# Patient Record
Sex: FEMALE | Race: WHITE | NOT HISPANIC OR LATINO | Employment: OTHER | URBAN - METROPOLITAN AREA
[De-identification: names, ages, dates, MRNs, and addresses within clinical notes are randomized per-mention and may not be internally consistent; named-entity substitution may affect disease eponyms.]

---

## 2017-02-11 ENCOUNTER — RADIANT APPOINTMENT (OUTPATIENT)
Dept: GENERAL RADIOLOGY | Facility: CLINIC | Age: 37
End: 2017-02-11
Attending: NURSE PRACTITIONER

## 2017-02-11 ENCOUNTER — OFFICE VISIT (OUTPATIENT)
Dept: URGENT CARE | Facility: URGENT CARE | Age: 37
End: 2017-02-11

## 2017-02-11 ENCOUNTER — TELEPHONE (OUTPATIENT)
Dept: NURSING | Facility: CLINIC | Age: 37
End: 2017-02-11

## 2017-02-11 ENCOUNTER — VIRTUAL VISIT (OUTPATIENT)
Dept: FAMILY MEDICINE | Facility: OTHER | Age: 37
End: 2017-02-11

## 2017-02-11 VITALS
TEMPERATURE: 100.2 F | HEART RATE: 105 BPM | OXYGEN SATURATION: 95 % | WEIGHT: 128.6 LBS | RESPIRATION RATE: 18 BRPM | DIASTOLIC BLOOD PRESSURE: 73 MMHG | SYSTOLIC BLOOD PRESSURE: 114 MMHG

## 2017-02-11 DIAGNOSIS — J18.9 PNEUMONIA OF BOTH LOWER LOBES DUE TO INFECTIOUS ORGANISM: Primary | ICD-10-CM

## 2017-02-11 DIAGNOSIS — J20.9 ACUTE BRONCHITIS WITH SYMPTOMS > 10 DAYS: ICD-10-CM

## 2017-02-11 DIAGNOSIS — R05.9 COUGH: ICD-10-CM

## 2017-02-11 LAB
BASOPHILS # BLD AUTO: 0 10E9/L (ref 0–0.2)
BASOPHILS NFR BLD AUTO: 0.1 %
DIFFERENTIAL METHOD BLD: ABNORMAL
EOSINOPHIL # BLD AUTO: 0.1 10E9/L (ref 0–0.7)
EOSINOPHIL NFR BLD AUTO: 0.5 %
ERYTHROCYTE [DISTWIDTH] IN BLOOD BY AUTOMATED COUNT: 12.2 % (ref 10–15)
HCT VFR BLD AUTO: 43.3 % (ref 35–47)
HGB BLD-MCNC: 14.6 G/DL (ref 11.7–15.7)
LYMPHOCYTES # BLD AUTO: 2 10E9/L (ref 0.8–5.3)
LYMPHOCYTES NFR BLD AUTO: 13.1 %
MCH RBC QN AUTO: 29.3 PG (ref 26.5–33)
MCHC RBC AUTO-ENTMCNC: 33.7 G/DL (ref 31.5–36.5)
MCV RBC AUTO: 87 FL (ref 78–100)
MONOCYTES # BLD AUTO: 1.5 10E9/L (ref 0–1.3)
MONOCYTES NFR BLD AUTO: 9.8 %
NEUTROPHILS # BLD AUTO: 11.7 10E9/L (ref 1.6–8.3)
NEUTROPHILS NFR BLD AUTO: 76.5 %
PLATELET # BLD AUTO: 366 10E9/L (ref 150–450)
RBC # BLD AUTO: 4.98 10E12/L (ref 3.8–5.2)
WBC # BLD AUTO: 15.2 10E9/L (ref 4–11)

## 2017-02-11 PROCEDURE — 87040 BLOOD CULTURE FOR BACTERIA: CPT | Performed by: NURSE PRACTITIONER

## 2017-02-11 PROCEDURE — 99203 OFFICE O/P NEW LOW 30 MIN: CPT | Mod: 25 | Performed by: NURSE PRACTITIONER

## 2017-02-11 PROCEDURE — 36415 COLL VENOUS BLD VENIPUNCTURE: CPT | Performed by: NURSE PRACTITIONER

## 2017-02-11 PROCEDURE — 71020 XR CHEST 2 VW: CPT

## 2017-02-11 PROCEDURE — 85025 COMPLETE CBC W/AUTO DIFF WBC: CPT | Performed by: NURSE PRACTITIONER

## 2017-02-11 PROCEDURE — 94640 AIRWAY INHALATION TREATMENT: CPT | Performed by: NURSE PRACTITIONER

## 2017-02-11 RX ORDER — AZITHROMYCIN 250 MG/1
TABLET, FILM COATED ORAL
Qty: 6 TABLET | Refills: 0 | Status: SHIPPED | OUTPATIENT
Start: 2017-02-11 | End: 2017-03-30

## 2017-02-11 RX ORDER — ALBUTEROL SULFATE 90 UG/1
2 AEROSOL, METERED RESPIRATORY (INHALATION) EVERY 6 HOURS PRN
Qty: 1 INHALER | Refills: 0 | Status: SHIPPED | OUTPATIENT
Start: 2017-02-11 | End: 2021-03-27

## 2017-02-11 RX ORDER — ALBUTEROL SULFATE 0.83 MG/ML
1 SOLUTION RESPIRATORY (INHALATION) ONCE
Qty: 3 ML | Refills: 0 | COMMUNITY
End: 2017-03-30

## 2017-02-11 RX ORDER — PREDNISONE 20 MG/1
TABLET ORAL
Qty: 10 TABLET | Refills: 0 | Status: SHIPPED | OUTPATIENT
Start: 2017-02-11 | End: 2017-03-30

## 2017-02-11 NOTE — PROGRESS NOTES
SUBJECTIVE:  Emani Luna is a 36 year old female who presents to the clinic today with a chief complaint of cough  for 2 week(s).  Her cough is described as nonproductive.    The patient's symptoms are mild and worsening.  Associated symptoms include congestion and fever. The patient's symptoms are exacerbated by no particular triggers  Patient has been using OTC cough suppressants  to improve symptoms.    No past medical history on file.    Social History   Substance Use Topics     Smoking status: Not on file     Smokeless tobacco: Not on file     Alcohol Use: Not on file         ROS  CONSTITUTIONAL:POSITIVE  for fever   INTEGUMENTARY/SKIN: NEGATIVE for worrisome rashes, moles or lesions  EYES: NEGATIVE for vision changes or irritation  ENT/MOUTH: NEGATIVE for ear, mouth and throat problems  RESP:See above   CV: NEGATIVE for chest pain, palpitations or peripheral edema  MUSCULOSKELETAL: NEGATIVE for significant arthralgias or myalgia  NEURO: NEGATIVE for weakness, dizziness or paresthesias    OBJECTIVE:  /73 mmHg  Pulse 125  Temp(Src) 100.2  F (37.9  C) (Tympanic)  Resp 16  Wt 128 lb 9.6 oz (58.333 kg)  SpO2 92%  GENERAL APPEARANCE: healthy, alert and no distress  EYES: EOMI,  PERRL, conjunctiva clear  HENT: ear canals and TM's normal.  Nose and mouth without ulcers, erythema or lesions  NECK: supple, nontender, no lymphadenopathy  RESP: lungs clear to auscultation - no rales, rhonchi or wheezes  CV: regular rates and rhythm, normal S1 S2, no murmur noted  ABDOMEN:  soft, nontender, no HSM or masses and bowel sounds normal  NEURO: Normal strength and tone, sensory exam grossly normal,  normal speech and mentation  SKIN: no suspicious lesions or rashes    X-RAY:   XR CHEST 2 VW   2/11/2017 2:03 PM       HISTORY: Acute bronchitis, unspecified     COMPARISON: None.                                                                       IMPRESSION: Bibasilar infiltrates suspicious for pneumonia. No  pleural  fluid. Heart size is normal.    ASSESSMENT:      ICD-10-CM    1. Pneumonia of both lower lobes due to infectious organism J18.9 Blood culture     amoxicillin-clavulanate (AUGMENTIN) 875-125 MG per tablet   2. Acute bronchitis with symptoms > 10 days J20.9 predniSONE (DELTASONE) 20 MG tablet     albuterol (PROAIR HFA/PROVENTIL HFA/VENTOLIN HFA) 108 (90 BASE) MCG/ACT Inhaler     azithromycin (ZITHROMAX Z-MATTHIEU) 250 MG tablet     XR Chest 2 Views     CBC with platelets differential   3. Cough R05 ALBUTEROL UNIT DOSE, 1 MG     INHALATION/NEBULIZER TREATMENT, INITIAL     CANCELED: XR Chest 2 Views         PLAN:  Patient was given an in office albuterol nebulizer treatment while in the office with clearing of their wheezing.   Patient given ibuprofen in the office.  Her fever reduced and heart rate decreased to 105. Saturation post nebulizer treatment was 95%    Based on her presenting syptoms and clinical findings will obtain a blood culture   Patient Instructions     Use Medication as directed  Hydrate with fluids, rest, cool humidifier.  May use acetaminophen, ibuprofen prn.    For your Cough   The Buckwheat Honey Dose: Given   hour Prior to Bedtime  For children age under 1 year -Do not use due to botulism risk     2-5 years -  tsp (2.5 mL)    6-11 years -1 tsp (5 mL)    12-18 years -2 tsp (10 mL)     Guaifenesin     Adult dose -Not to exceed 2.4 g (2400mg) per day    Child age 6-12 years -100 mg every 4 hr, not to exceed 1.2 g (1200mg) per day     Pediatric, 2-6 years -50 mg every 4 hr as needed, not to exceed 600 mg per day    Cough medications is not recommended in children under 2 years.  With use of cough medications have combination medications be aware of products in the cough medication you are using to avoid overdose    Follow up with PCP in 2 weeks.    Go to Emergency Room if sx worsen or change, Shortness of breath, chest pain, persistent fevers, or painful breathing occur.     Patient voiced  understanding of instructions given.          Bronchitis, Antibiotic Treatment (Adult)    Bronchitis is an infection of the air passages (bronchial tubes) in your lungs. It often occurs when you have a cold. This illness is contagious during the first few days and is spread through the air by coughing and sneezing, or by direct contact (touching the sick person and then touching your own eyes, nose, or mouth).  Symptoms of bronchitis include cough with mucus (phlegm) and low-grade fever. Bronchitis usually lasts 7 to 14 days. Mild cases can be treated with simple home remedies. More severe infection is treated with an antibiotic.  Home care  Follow these guidelines when caring for yourself at home:    If your symptoms are severe, rest at home for the first 2 to 3 days. When you go back to your usual activities, don't let yourself get too tired.    Do not smoke. Also avoid being exposed to secondhand smoke.    You may use over-the-counter medicines to control fever or pain, unless another medicine was prescribed. (Note: If you have chronic liver or kidney disease or have ever had a stomach ulcer or gastrointestinal bleeding, talk with your healthcare provider before using these medicines. Also talk to your provider if you are taking medicine to prevent blood clots.) Aspirin should never be given to anyone younger than 18 years of age who is ill with a viral infection or fever. It may cause severe liver or brain damage.    Your appetite may be poor, so a light diet is fine. Avoid dehydration by drinking 6 to 8 glasses of fluids per day (such as water, soft drinks, sports drinks, juices, tea, or soup). Extra fluids will help loosen secretions in the nose and lungs.    Over-the-counter cough, cold, and sore-throat medicines will not shorten the length of the illness, but they may be helpful to reduce symptoms. (Note: Do not use decongestants if you have high blood pressure.)    Finish all antibiotic medicine. Do this  even if you are feeling better after only a few days.  Follow-up care  Follow up with your healthcare provider, or as advised. If you had an X-ray or ECG (electrocardiogram), a specialist will review it. You will be notified of any new findings that may affect your care.  Note: If you are age 65 or older, or if you have a chronic lung disease or condition that affects your immune system, or you smoke, talk to your healthcare provider about having pneumococcal vaccinations and a yearly influenza vaccination (flu shot).  When to seek medical advice  Call your healthcare provider right away if any of these occur:    Fever of 100.4 F (38 C) or higher    Coughing up increased amounts of colored sputum    Weakness, drowsiness, headache, facial pain, ear pain, or a stiff neck   Call 911, or get immediate medical care  Contact emergency services right away if any of these occur.    Coughing up blood    Worsening weakness, drowsiness, headache, or stiff neck    Trouble breathing, wheezing, or pain with breathing    0521-1055 [employer]. [address] [place] [address]. All rights reserved. This information is not intended as a substitute for professional medical care. Always follow your healthcare professional's instructions.        Discharge Instructions for Pneumonia  You have been diagnosed with pneumonia, a serious lung infection. Most cases of pneumonia are caused by bacteria. Pneumonia most often occurs in older adults, young children, and people with chronic health problems.  Home care    Take your medication exactly as directed. Don t skip doses. Continue taking your antibiotics as directed until they are all gone -- even if you start to feel better. This will prevent the pneumonia from coming back.    Drink at least 8 glasses of water daily, unless directed otherwise. This helps to loosen and thin secretions so that you can cough them up.    Use a cool-mist humidifier in your bedroom. Be  sure to clean the humidifier daily.    Coughing up mucus is normal. Don t use medications to suppress your cough unless your cough is dry, painful, or interferes with your sleep. You may use an expectorant if ordered by your doctor.    Warm compresses or a heating pad on the lowest setting can be used to relieve chest discomfort. Use several times a day for 15 to 20 minutes at a time. (To prevent injuring your skin, be sure the temperature of the compress or heating pad is warm, not hot.)    Get plenty of rest until your fever, shortness of breath, and chest pain go away.    Plan to get a flu shot every year.    Ask your doctor about pneumonia vaccinations.     Follow-up care  Make a follow-up appointment as directed by our staff.     When to seek medical care  Call 911 right away if you have any of the following:    Chest pain    Trouble breathing    Blue lips or fingernails  Otherwise, call your doctor if you have any of the following:    Fever above 101.5 F  (38.6 C)    Yellow, green, bloody, or smelly sputum    More than normal mucus production    Vomiting     3527-6668 [employer]. [address] [place] [address]. All rights reserved. This information is not intended as a substitute for professional medical care. Always follow your healthcare professional's instructions.          KIKA Velarde CNP

## 2017-02-11 NOTE — MR AVS SNAPSHOT
After Visit Summary   2/11/2017    Emani Luna    MRN: [id number]           Patient Information     Date Of Birth          1980        Visit Information        Provider Department      2/11/2017 12:25 PM Liliana Knox APRN [facility]        Today's Diagnoses     Cough    -  1     Acute bronchitis with symptoms > 10 days         Pneumonia of both lower lobes due to infectious organism           Care Instructions    Use Medication as directed  Hydrate with fluids, rest, cool humidifier.  May use acetaminophen, ibuprofen prn.    For your Cough   The Buckwheat Honey Dose: Given   hour Prior to Bedtime  For children age under 1 year -Do not use due to botulism risk     2-5 years -  tsp (2.5 mL)    6-11 years -1 tsp (5 mL)    12-18 years -2 tsp (10 mL)     Guaifenesin     Adult dose -Not to exceed 2.4 g (2400mg) per day    Child age 6-12 years -100 mg every 4 hr, not to exceed 1.2 g (1200mg) per day     Pediatric, 2-6 years -50 mg every 4 hr as needed, not to exceed 600 mg per day    Cough medications is not recommended in children under 2 years.  With use of cough medications have combination medications be aware of products in the cough medication you are using to avoid overdose    Follow up with PCP in 2 weeks.    Go to Emergency Room if sx worsen or change, Shortness of breath, chest pain, persistent fevers, or painful breathing occur.     Patient voiced understanding of instructions given.          Bronchitis, Antibiotic Treatment (Adult)    Bronchitis is an infection of the air passages (bronchial tubes) in your lungs. It often occurs when you have a cold. This illness is contagious during the first few days and is spread through the air by coughing and sneezing, or by direct contact (touching the sick person and then touching your own eyes, nose, or mouth).  Symptoms of bronchitis include cough with mucus (phlegm) and low-grade fever. Bronchitis usually  lasts 7 to 14 days. Mild cases can be treated with simple home remedies. More severe infection is treated with an antibiotic.  Home care  Follow these guidelines when caring for yourself at home:    If your symptoms are severe, rest at home for the first 2 to 3 days. When you go back to your usual activities, don't let yourself get too tired.    Do not smoke. Also avoid being exposed to secondhand smoke.    You may use over-the-counter medicines to control fever or pain, unless another medicine was prescribed. (Note: If you have chronic liver or kidney disease or have ever had a stomach ulcer or gastrointestinal bleeding, talk with your healthcare provider before using these medicines. Also talk to your provider if you are taking medicine to prevent blood clots.) Aspirin should never be given to anyone younger than 18 years of age who is ill with a viral infection or fever. It may cause severe liver or brain damage.    Your appetite may be poor, so a light diet is fine. Avoid dehydration by drinking 6 to 8 glasses of fluids per day (such as water, soft drinks, sports drinks, juices, tea, or soup). Extra fluids will help loosen secretions in the nose and lungs.    Over-the-counter cough, cold, and sore-throat medicines will not shorten the length of the illness, but they may be helpful to reduce symptoms. (Note: Do not use decongestants if you have high blood pressure.)    Finish all antibiotic medicine. Do this even if you are feeling better after only a few days.  Follow-up care  Follow up with your healthcare provider, or as advised. If you had an X-ray or ECG (electrocardiogram), a specialist will review it. You will be notified of any new findings that may affect your care.  Note: If you are age 65 or older, or if you have a chronic lung disease or condition that affects your immune system, or you smoke, talk to your healthcare provider about having pneumococcal vaccinations and a yearly  influenza vaccination (flu shot).  When to seek medical advice  Call your healthcare provider right away if any of these occur:    Fever of 100.4 F (38 C) or higher    Coughing up increased amounts of colored sputum    Weakness, drowsiness, headache, facial pain, ear pain, or a stiff neck   Call 911, or get immediate medical care  Contact emergency services right away if any of these occur.    Coughing up blood    Worsening weakness, drowsiness, headache, or stiff neck    Trouble breathing, wheezing, or pain with breathing    4854-5507 [employer]. [address] [place] [address]. All rights reserved. This information is not intended as a substitute for professional medical care. Always follow your healthcare professional's instructions.        Discharge Instructions for Pneumonia  You have been diagnosed with pneumonia, a serious lung infection. Most cases of pneumonia are caused by bacteria. Pneumonia most often occurs in older adults, young children, and people with chronic health problems.  Home care    Take your medication exactly as directed. Don t skip doses. Continue taking your antibiotics as directed until they are all gone -- even if you start to feel better. This will prevent the pneumonia from coming back.    Drink at least 8 glasses of water daily, unless directed otherwise. This helps to loosen and thin secretions so that you can cough them up.    Use a cool-mist humidifier in your bedroom. Be sure to clean the humidifier daily.    Coughing up mucus is normal. Don t use medications to suppress your cough unless your cough is dry, painful, or interferes with your sleep. You may use an expectorant if ordered by your doctor.    Warm compresses or a heating pad on the lowest setting can be used to relieve chest discomfort. Use several times a day for 15 to 20 minutes at a time. (To prevent injuring your skin, be sure the temperature of the compress or heating pad is warm, not  "hot.)    Get plenty of rest until your fever, shortness of breath, and chest pain go away.    Plan to get a flu shot every year.    Ask your doctor about pneumonia vaccinations.     Follow-up care  Make a follow-up appointment as directed by our staff.     When to seek medical care  Call 911 right away if you have any of the following:    Chest pain    Trouble breathing    Blue lips or fingernails  Otherwise, call your doctor if you have any of the following:    Fever above 101.5 F  (38.6 C)    Yellow, green, bloody, or smelly sputum    More than normal mucus production    Vomiting     6939-4665 [employer]. [address] [place] [address]. All rights reserved. This information is not intended as a substitute for professional medical care. Always follow your healthcare professional's instructions.              Follow-ups after your visit        Who to contact     If you have questions or need follow up information about today's clinic visit or your schedule please contact [facility] directly at [phone].  Normal or non-critical lab and imaging results will be communicated to you by [employer], letter or phone within 4 business days after the clinic has received the results. If you do not hear from us within 7 days, please contact the clinic through [employer] or phone. If you have a critical or abnormal lab result, we will notify you by phone as soon as possible.  Submit refill requests through [employer] or call your pharmacy and they will forward the refill request to us. Please allow 3 business days for your refill to be completed.          Additional Information About Your Visit        [employer] Information     [employer] lets you send messages to your doctor, view your test results, renew your prescriptions, schedule appointments and more. To sign up, go to [email or web address] . Click on \"Log in\" on the left side of the screen, which will take you to the " "Welcome page. Then click on \"Sign up Now\" on the right side of the page.     You will be asked to enter the access code listed below, as well as some personal information. Please follow the directions to create your username and password.     Your access code is: [email or web address]  Expires: 2017  2:53 PM     Your access code will  in 90 days. If you need help or a new code, please call your [facility] clinic or [phone].        Care EveryWhere ID     This is your Care EveryWhere ID. This could be used by other organizations to access your [facility] medical records  [id number]        Your Vitals Were     Pulse Temperature Respirations Pulse Oximetry          125 100.2  F (37.9  C) (Tympanic) 16 92%         Blood Pressure from Last 3 Encounters:   17 114/73    Weight from Last 3 Encounters:   17 128 lb 9.6 oz (58.333 kg)              We Performed the Following     ALBUTEROL UNIT DOSE, 1 MG     Blood culture     CBC with platelets differential     INHALATION/NEBULIZER TREATMENT, INITIAL          Today's Medication Changes          These changes are accurate as of: 17  2:53 PM.  If you have any questions, ask your nurse or doctor.               Start taking these medicines.        Dose/Directions    amoxicillin-clavulanate 875-125 MG per tablet   Commonly known as:  AUGMENTIN   Used for:  Pneumonia of both lower lobes due to infectious organism   Started by:  Liliana Knox APRN CNP        Dose:  1 tablet   Take 1 tablet by mouth 2 times daily   Quantity:  20 tablet   Refills:  0       azithromycin 250 MG tablet   Commonly known as:  ZITHROMAX Z-MATTHIEU   Used for:  Acute bronchitis with symptoms > 10 days   Started by:  Liliana Knox APRN CNP        Take 2 tablets on day 1 and then take 1 tablet days 2-5   Quantity:  6 tablet   Refills:  0       predniSONE 20 MG tablet   Commonly known as:  DELTASONE   Used for:  Acute bronchitis with symptoms > 10 days   Started by:  Liliana Knox APRN " CNP        Take one tablet twice a day for 5 days.   Quantity:  10 tablet   Refills:  0         These medicines have changed or have updated prescriptions.        Dose/Directions    * albuterol (2.5 MG/3ML) 0.083% neb solution   This may have changed:  Another medication with the same name was added. Make sure you understand how and when to take each.   Changed by:  Liliana Knox APRN CNP        Dose:  1 vial   Take 1 vial (2.5 mg) by nebulization once   Quantity:  3 mL   Refills:  0       * albuterol 108 (90 BASE) MCG/ACT Inhaler   Commonly known as:  PROAIR HFA/PROVENTIL HFA/VENTOLIN HFA   This may have changed:  You were already taking a medication with the same name, and this prescription was added. Make sure you understand how and when to take each.   Used for:  Acute bronchitis with symptoms > 10 days   Changed by:  Liliana Knox APRN CNP        Dose:  2 puff   Inhale 2 puffs into the lungs every 6 hours as needed for shortness of breath / dyspnea or wheezing   Quantity:  1 Inhaler   Refills:  0       * Notice:  This list has 2 medication(s) that are the same as other medications prescribed for you. Read the directions carefully, and ask your doctor or other care provider to review them with you.         Where to get your medicines      These medications were sent to [id number] - [facility] [address]  [address] [address]    Hours:  Closed 10-16-08 business to [facility] Phone:  [phone]    - albuterol 108 (90 BASE) MCG/ACT Inhaler  - amoxicillin-clavulanate 875-125 MG per tablet  - azithromycin 250 MG tablet  - predniSONE 20 MG tablet             Primary Care Provider    None Specified       No primary provider on file.        Thank you!     Thank you for choosing [facility]  for your care. Our goal is always to provide you with excellent care. Hearing back from our patients is one way we can continue to improve our  services. Please take a few minutes to complete the written survey that you may receive in the mail after your visit with us. Thank you!             Your Updated Medication List - Protect others around you: Learn how to safely use, store and throw away your medicines at www.disposemymeds.org.          This list is accurate as of: 2/11/17  2:53 PM.  Always use your most recent med list.                   Brand Name Dispense Instructions for use    * albuterol (2.5 MG/3ML) 0.083% neb solution     3 mL    Take 1 vial (2.5 mg) by nebulization once       * albuterol 108 (90 BASE) MCG/ACT Inhaler    PROAIR HFA/PROVENTIL HFA/VENTOLIN HFA    1 Inhaler    Inhale 2 puffs into the lungs every 6 hours as needed for shortness of breath / dyspnea or wheezing       amoxicillin-clavulanate 875-125 MG per tablet    AUGMENTIN    20 tablet    Take 1 tablet by mouth 2 times daily       azithromycin 250 MG tablet    ZITHROMAX Z-MATTHIEU    6 tablet    Take 2 tablets on day 1 and then take 1 tablet days 2-5       predniSONE 20 MG tablet    DELTASONE    10 tablet    Take one tablet twice a day for 5 days.       * Notice:  This list has 2 medication(s) that are the same as other medications prescribed for you. Read the directions carefully, and ask your doctor or other care provider to review them with you.

## 2017-02-11 NOTE — TELEPHONE ENCOUNTER
Call Type: Triage Call    Presenting Problem: Brijesh has the flu fever and cold sweats which  started over one  week ago.  So far 12 days.   Today brijesh is  coughing up green phelgm.  [facility] Triage/Cough/disposition is to be  seen within 24 hours and Brijesh agreed.  Triage Note:  Guideline Title: Cough - Adult  Recommended Disposition: See Provider within 24 hours  Original Inclination: Wanted to speak with a nurse  Override Disposition:  Intended Action: Call PCP/HCP  Physician Contacted: No  New productive cough with thick colored sputum (other than clear or white sputum;  not postnasal drainage) ?  YES  Severe breathing problems ? NO  Continuous cough causing difficulty breathing ? NO  Coughing up large amount of obvious blood (not blood-streaked sputum) ? NO  Blood streaked sputum ? NO  Cough producing pink, frothy sputum ? NO  Any temperature elevation in an immunocompromised individual or a frail elderly  person ? NO  New onset or worsening cough AND asthma with increasing frequency of flare-ups  since last scheduled appointment ? NO  New onset or worsening cough AND temperature of 101.5 F (38.6C) or greater ? NO  Breathing symptoms (post choking episode, shortness of breath, out of breath,  nasal flaring, change in skin color, anxiety) main problem ? NO  New onset or worsening cough AND recent (within 4 wks.) surgery or trauma, or  prolonged immobilization (bedrest, long travel), or smoker taking medication with  estrogen ? NO  Sudden onset of flu-like symptoms ? NO  New or worsening cough AND known cardiac or respiratory condition not responding  to treatment OR treatment not available ? NO  New or worsening cough AND known cardiac or respiratory condition ? NO  Sudden onset of shortness of breath, chest pain and cough with blood tinged sputum  ? NO  Being treated by a provider for a secondary infection AND no improvement in  symptoms, symptoms have worsened OR has new symptoms after following treatment  plan for  the time specified by provider. ? NO  Moderate to severe pain occurring with deep breath without other symptoms ? NO  Productive cough for one full day or more ? NO  Sharp chest pain only occurring with deep breath or cough AND not responsive to  home care ? NO  Physician Instructions:  Care Advice: Use a cool mist humidifier to moisten air.  Be sure to clean  according to 's instructions.  Limit or avoid exposure to irritants and allergens (e.g. air pollution,  smoke/smoking, chemicals, dust, pollen, pet dander, etc.)  Call provider if fever greater than  101.5 F (38.6 C)  or 100.5 F (38.1C)  in an immunocompromised patient (such as diabetes, HIV/AIDS, renal disease,  chemotherapy, organ transplant, or chronic steroid use) has not improved in  24 hours.  Drink more fluids -- water, low-sugar juices, tea and warm soup, especially  chicken broth, are options.  Avoid caffeinated or alcoholic beverages  because they can increase the chance of dehydration.  CAUTIONS  If you can, stop smoking now and avoid all secondhand smoke.  HEALTH PROMOTION / MAINTENANCE  SYMPTOM / CONDITION MANAGEMENT  Coughing up mucus or phlegm helps to get rid of an infection.  A productive  cough should not be stopped.  A cough medicine with guaifenesin  (Robitussin, Mucinex) can help loosen the mucus.  Cough medicine with  dextromethorphan (DM) should be avoided.  Drinking lots of fluids can help  loosen the mucus too, especially warm fluids.  Total water intake includes drinking water, water in beverages, and water  contained in food. Fluids make up about 80% of the body's total hydration  need. Individual fluid requirement to maintain hydration vary based on  physical activity, environmental factors and illness. Limit fluids that  contain sugar, caffeine, or alcohol. Urine will be very light yellow color  when you drink enough fluids.

## 2017-02-11 NOTE — PATIENT INSTRUCTIONS
Use Medication as directed  Hydrate with fluids, rest, cool humidifier.  May use acetaminophen, ibuprofen prn.    For your Cough   The Buckwheat Honey Dose: Given   hour Prior to Bedtime  For children age under 1 year -Do not use due to botulism risk     2-5 years -  tsp (2.5 mL)    6-11 years -1 tsp (5 mL)    12-18 years -2 tsp (10 mL)     Guaifenesin     Adult dose -Not to exceed 2.4 g (2400mg) per day    Child age 6-12 years -100 mg every 4 hr, not to exceed 1.2 g (1200mg) per day     Pediatric, 2-6 years -50 mg every 4 hr as needed, not to exceed 600 mg per day    Cough medications is not recommended in children under 2 years.  With use of cough medications have combination medications be aware of products in the cough medication you are using to avoid overdose    Follow up with PCP in 2 weeks.    Go to Emergency Room if sx worsen or change, Shortness of breath, chest pain, persistent fevers, or painful breathing occur.     Patient voiced understanding of instructions given.          Bronchitis, Antibiotic Treatment (Adult)    Bronchitis is an infection of the air passages (bronchial tubes) in your lungs. It often occurs when you have a cold. This illness is contagious during the first few days and is spread through the air by coughing and sneezing, or by direct contact (touching the sick person and then touching your own eyes, nose, or mouth).  Symptoms of bronchitis include cough with mucus (phlegm) and low-grade fever. Bronchitis usually lasts 7 to 14 days. Mild cases can be treated with simple home remedies. More severe infection is treated with an antibiotic.  Home care  Follow these guidelines when caring for yourself at home:    If your symptoms are severe, rest at home for the first 2 to 3 days. When you go back to your usual activities, don't let yourself get too tired.    Do not smoke. Also avoid being exposed to secondhand smoke.    You may use over-the-counter medicines to control fever or pain,  unless another medicine was prescribed. (Note: If you have chronic liver or kidney disease or have ever had a stomach ulcer or gastrointestinal bleeding, talk with your healthcare provider before using these medicines. Also talk to your provider if you are taking medicine to prevent blood clots.) Aspirin should never be given to anyone younger than 18 years of age who is ill with a viral infection or fever. It may cause severe liver or brain damage.    Your appetite may be poor, so a light diet is fine. Avoid dehydration by drinking 6 to 8 glasses of fluids per day (such as water, soft drinks, sports drinks, juices, tea, or soup). Extra fluids will help loosen secretions in the nose and lungs.    Over-the-counter cough, cold, and sore-throat medicines will not shorten the length of the illness, but they may be helpful to reduce symptoms. (Note: Do not use decongestants if you have high blood pressure.)    Finish all antibiotic medicine. Do this even if you are feeling better after only a few days.  Follow-up care  Follow up with your healthcare provider, or as advised. If you had an X-ray or ECG (electrocardiogram), a specialist will review it. You will be notified of any new findings that may affect your care.  Note: If you are age 65 or older, or if you have a chronic lung disease or condition that affects your immune system, or you smoke, talk to your healthcare provider about having pneumococcal vaccinations and a yearly influenza vaccination (flu shot).  When to seek medical advice  Call your healthcare provider right away if any of these occur:    Fever of 100.4 F (38 C) or higher    Coughing up increased amounts of colored sputum    Weakness, drowsiness, headache, facial pain, ear pain, or a stiff neck   Call 911, or get immediate medical care  Contact emergency services right away if any of these occur.    Coughing up blood    Worsening weakness, drowsiness, headache, or stiff neck    Trouble breathing,  wheezing, or pain with breathing    4470-2594 [employer]. [address] [place] [address], [place] [address]. All rights reserved. This information is not intended as a substitute for professional medical care. Always follow your healthcare professional's instructions.        Discharge Instructions for Pneumonia  You have been diagnosed with pneumonia, a serious lung infection. Most cases of pneumonia are caused by bacteria. Pneumonia most often occurs in older adults, young children, and people with chronic health problems.  Home care    Take your medication exactly as directed. Don t skip doses. Continue taking your antibiotics as directed until they are all gone -- even if you start to feel better. This will prevent the pneumonia from coming back.    Drink at least 8 glasses of water daily, unless directed otherwise. This helps to loosen and thin secretions so that you can cough them up.    Use a cool-mist humidifier in your bedroom. Be sure to clean the humidifier daily.    Coughing up mucus is normal. Don t use medications to suppress your cough unless your cough is dry, painful, or interferes with your sleep. You may use an expectorant if ordered by your doctor.    Warm compresses or a heating pad on the lowest setting can be used to relieve chest discomfort. Use several times a day for 15 to 20 minutes at a time. (To prevent injuring your skin, be sure the temperature of the compress or heating pad is warm, not hot.)    Get plenty of rest until your fever, shortness of breath, and chest pain go away.    Plan to get a flu shot every year.    Ask your doctor about pneumonia vaccinations.     Follow-up care  Make a follow-up appointment as directed by our staff.     When to seek medical care  Call 911 right away if you have any of the following:    Chest pain    Trouble breathing    Blue lips or fingernails  Otherwise, call your doctor if you have any of the following:    Fever above 101.5 F   (38.6 C)    Yellow, green, bloody, or smelly sputum    More than normal mucus production    Vomiting     2803-9043 [employer]. [address] [place] [address], [place] [address]. All rights reserved. This information is not intended as a substitute for professional medical care. Always follow your healthcare professional's instructions.

## 2017-02-13 NOTE — PROGRESS NOTES
Date:   Clinician: Vanesa Rodriguez  Clinician NPI: [id number]  Patient: Emani Luna  Patient : 1980  Patient Address: [address] [place] [address]  Patient Phone: [phone]  Visit Protocol: URI  Patient Summary:  Emani is a 36 year old ( : 1980 ) female who initiated a Zip for significant cough.      Her  symptoms started gradually 10-13 days ago  and consist of malaise, chest pain, chills, loss of appetite, myalgias, cough, and post-nasal drainage.   She denies rhinitis, dyspnea, facial pain or pressure, headache, fever, ear pain, hoarse voice, sore throat, nasal congestion, itchy eyes, vomiting, nausea, and dysphagia. She denies a history of facial surgery. When asked follow-up questions about her chest pain she denied feeling chest pain or pressure at rest, worsening chest pain with activity, that the chest pain is causing shortness of breath or that the chest pain is the main concern or reason for seeking medical care.   When asked to feel her neck she denied feeling enlarged lymph nodes. She denies axillary lymphadenopathy.   Emani denies asthma. Her severe (cough every 1-2 min) productive cough is more bothersome at night. She believes the cough is caused by post-nasal drainage. Her cough produces green sputum.   She denies rigors.   Emani denies having COPD or other chronic lung disease.   Pulse: Not measured beats in 10 seconds.    Weight (in lbs): 126   She states she is not pregnant and denies breastfeeding. She no longer menstruates.   Emani smokes or uses smokeless tobacco.    MEDICATIONS:  No current medications , ALLERGIES:  NKDA   Clinician Response:  Dear Emani,  Based on the information you have provided, I am unable to diagnose and treat you without additional information. Please be seen in a clinic or urgent care to determine the treatment that is best for you. You will not be charged for this Zip. Thanks for using [facility].   Diagnosis: Unable to  diagnose  Diagnosis ICD: R69  Additional Clinician Notes: Based on your symptoms, I am not able to safely diagnose and treat you via [facility] today. Please be seen in the clinic for further evaluation. A provider will be able to listen to to your lungs to determine the best treatment plan for you. You also may require further testing.

## 2017-02-17 LAB
BACTERIA SPEC CULT: NORMAL
MICRO REPORT STATUS: NORMAL
SPECIMEN SOURCE: NORMAL

## 2017-03-20 ENCOUNTER — VIRTUAL VISIT (OUTPATIENT)
Dept: FAMILY MEDICINE | Facility: OTHER | Age: 37
End: 2017-03-20

## 2017-03-20 NOTE — PROGRESS NOTES
Date:   Clinician: Rosana Dumont  Clinician NPI: [id number]  Patient: Emani Luna  Patient : 1980  Patient Address: [address] [place], [place] [id number]  Patient Phone: [phone]  Visit Protocol: URI  Patient Summary:  Emani is a 36 year old ( : 1980 ) female who initiated a Zip for influenza prophylaxis.      Emani has been exposed to influenza via her other close contact (relationship not specified). The influenza diagnosis was confirmed by a lab test. Emani does not know if the close contact(s) are taking antiviral medications (e.g. Tamiflu or Relenza) to treat the symptoms. 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 She did not specify the reason why she has not gotten the vaccine.   Emani denies having COPD or other chronic lung disease. She also denies a family history of fructose intolerance and having cold or influenza symptoms.    Weight (in lbs): 129   She states she is not pregnant and denies breastfeeding. She no longer menstruates.   Emani does not smoke or use smokeless tobacco.    MEDICATIONS:  No current medications , ALLERGIES:  NKDA   Clinician Response:  Dear Emani,  Based on the information you have provided, you are eligible for influenza (flu) prevention medication.   I am prescribing oseltamivir (Tamiflu) 75 mg for influenza prophylaxis (prevention). Take one tablet by mouth once a day for 10 days. There are no refills with this prescription.   Influenza is a preventable viral illness. The most effective way to prevent influenza is by getting the influenza vaccine (the flu shot or flu nasal spray) before each flu season and using simple infection control measures such as hand washing.  The most common influenza symptoms include fever, cough, sore throat, runny or stuffy nose, body aches, headache, chills,  and fatigue. Symptoms may last up to 7 days with cough symptoms lasting up to 14 days.  If you have influenza symptoms, in most cases, you should stay home and avoid contact with other people unless you need to seek care in a healthcare setting. Drink plenty of liquids, especially water and take time to rest your body. This may mean taking a nap or going to bed earlier. Your body is fighting an infection and liquids and rest will improve the pace of recovery. Remember to regularly wash your hands and avoid close contact with others to prevent spreading your infection.   Diagnosis: Influenza prophylaxis  Diagnosis ICD: Z20.828  Prescription: oseltamivir (Tamiflu) 75mg oral tablet 10 tablets, 10 days supply. Take one tablet by mouth once a day for 10 days. Refills: 0, Refill as needed: no, Allow substitutions: yes  Pharmacy: [id number] - [phone] - [address] [place] [address]

## 2017-03-27 ENCOUNTER — TELEPHONE (OUTPATIENT)
Dept: NURSING | Facility: CLINIC | Age: 37
End: 2017-03-27

## 2017-03-27 NOTE — TELEPHONE ENCOUNTER
Call Type: Triage Call    Presenting Problem: Diagnosed with PNA in February, and now started  on Tamiflu via [facility] 5 days ago, has 2 months  Triage Note:  Guideline Title: Flu-Like Symptoms ; Flu-Like Symptoms  Recommended Disposition: Provide Home/Self Care  Original Inclination: Wanted to speak with a nurse  Override Disposition:  Intended Action: Follow Selfcare / Homecare  Physician Contacted: No  Has questions/concerns about exposure to influenza ?  YES  Signs of dehydration ? NO  Severe breathing problems ? NO  Breathing problems ? NO  Dry or minimally productive cough for up to three weeks after initial symptoms ? NO  Sudden change in mental status ? NO  Choking sensation, cannot swallow own saliva with associated drooling and soft  muffled voice ? NO  New onset of eye redness, irritation/foreign body sensation or gritty feeling with  watery or sticky mucus drainage ? NO  Temperature of 101.5 F (38.6 C) or greater that has not responded to 24 hours of  home care measures ? NO  Diagnosed with influenza by provider AND has questions/concerns ? NO  New onset of stiff neck causing pain with forward head movement, severe  generalized headache, fever, or altered mental status ? NO  Any temperature elevation in an individual with a weakened immune system OR a  frail elderly person ? NO  Flu-like symptoms associated with a cough and breathing that is becoming more  difficult ? NO  Evaluated by provider AND symptoms worsening when following recommended treatment  plan for 48 hours ? NO  New OR worsening flu-like illness AND in high risk group for complications ? NO  In high risk group for complications of influenza AND has questions/concerns ? NO  Flu-like illness that has not improved after 7 days of home care ? NO  Flu-like illness AND not in a high risk group ? NO  Gradual onset of upper respiratory illness signs and symptoms(If there is any  doubt that symptoms may be an upper respiratory illness, use this  guideline,  Flu-Like Symptoms ) ? NO  Being treated by a provider for a secondary infection AND no improvement in  symptoms, symptoms have worsened OR has new symptoms after following treatment  plan for the time specified by provider. ? NO  Severe headache not relieved with 8 hours of home care ? NO  New productive cough with thick colored sputum (other than clear or white sputum;  not postnasal drainage) ? NO  Pressure/pain above or below eyes, near ears over sinuses (may also be described  as fullness, worsens when bending forward, teeth or eye pain) ? NO  Physician Instructions:  Care Advice: HEALTH PROMOTION / MAINTENANCE  Influenza - Expected Course: - Symptoms start to improve in 3 to 7 days. -  Cough and feeling tired (malaise) may continue for several weeks. - Cough  and extreme fatigue lasting more than 3 weeks needs medical evaluation. -  Remain at home at least 24 hours after being free of fever 100F (37.8C)  without the use of fever reducing medication.  Influenza - Face Masks Disposable face masks should be used by: - People  with flu-like symptoms when in close contact with others in the home, if  breastfeeding a baby, or when leaving the home for medical care - People at  high risk for flu-related complications  * Disposable face masks are  available at pharmacies and hardware or building supply stores * Used face  masks can be thrown away in the regular trash.  Wash hands with soap and  water or with alcohol-based gel after removing a face mask.  Influenza Immunization - Possible Reactions: * Local reactions occur 15% to  20% of the time. Soreness, redness, and swelling at the site of injection  generally last 1 to 2 days. - To relieve local reactions, apply a cool  compress to the injection site for 20 minutes 3 to 4 times a day.  Consider  taking nonprescription, non-aspirin analgesics, such as acetaminophen or  ibuprofen as directed on the package. * Symptoms to the shot may cause mild  fever,  muscle aches and fatigue as your body produces protective  antibodies. The nasal vaccine can cause runny nose, headache, sore throat,  cough, and fatigue. Symptoms of vaccine reaction last 1 to 2 days.  - To  help these systemic symptoms, rest until symptoms improve, increase your  intake of fluids and consider taking nonprescription, non-aspirin  analgesics, such as acetaminophen or ibuprofen as directed on the package.  Influenza Prevention - Good Health Habits: - Practice good health habits:  Get 7-8 hours of sleep each night.  Eat healthy foods and drink sugar-free  fluids.  Exercise most days of a week and manage your stress.  - Practice  prevention by covering your mouth and nose with a tissue when sneezing or  coughing and throwing the tissue into the trash after one use.  Wash your  hands often or use an alcohol-based gel or wipe.  Avoid touching your eyes,  nose and mouth.  - Be sure to keep your immunizations up to date. - Avoid  crowds during peak flu season.  - Stay at home when not feeling well and  avoid close contact with people who are sick.  Influenza - Transmission: - Flu virus is spread through the air in droplets  when a flu-infected person coughs, sneezes or talks and another person  breathes in the droplets, or by touching a surface like a door knob,  telephone, or keyboard that has been contaminated by the droplets and then  touching the mouth, nose, or eyes. - An individual may be passing on the  flu before they have any symptoms. - An individual may continue to be  contagious with the flu for up to 7 days after the symptoms start.

## 2017-03-30 ENCOUNTER — RADIANT APPOINTMENT (OUTPATIENT)
Dept: GENERAL RADIOLOGY | Facility: CLINIC | Age: 37
End: 2017-03-30
Attending: FAMILY MEDICINE

## 2017-03-30 ENCOUNTER — OFFICE VISIT (OUTPATIENT)
Dept: URGENT CARE | Facility: URGENT CARE | Age: 37
End: 2017-03-30

## 2017-03-30 VITALS
DIASTOLIC BLOOD PRESSURE: 72 MMHG | RESPIRATION RATE: 22 BRPM | TEMPERATURE: 98.9 F | HEART RATE: 113 BPM | SYSTOLIC BLOOD PRESSURE: 129 MMHG | OXYGEN SATURATION: 98 %

## 2017-03-30 DIAGNOSIS — R05.9 COUGH: ICD-10-CM

## 2017-03-30 DIAGNOSIS — J18.9 COMMUNITY ACQUIRED PNEUMONIA: ICD-10-CM

## 2017-03-30 DIAGNOSIS — R05.9 COUGH: Primary | ICD-10-CM

## 2017-03-30 PROCEDURE — 71020 XR CHEST 2 VW: CPT

## 2017-03-30 PROCEDURE — 99213 OFFICE O/P EST LOW 20 MIN: CPT | Performed by: FAMILY MEDICINE

## 2017-03-30 RX ORDER — LEVOFLOXACIN 500 MG/1
500 TABLET, FILM COATED ORAL DAILY
Qty: 10 TABLET | Refills: 0 | Status: SHIPPED | OUTPATIENT
Start: 2017-03-30 | End: 2021-03-27

## 2017-03-30 RX ORDER — PREDNISONE 20 MG/1
40 TABLET ORAL DAILY
Qty: 10 TABLET | Refills: 0 | Status: SHIPPED | OUTPATIENT
Start: 2017-03-30 | End: 2017-04-04

## 2017-03-30 NOTE — MR AVS SNAPSHOT
After Visit Summary   3/30/2017    Emani Luna    MRN: [id number]           Patient Information     Date Of Birth          1980        Visit Information        Provider Department      3/30/2017 5:55 PM Ankit Bright MD [facility]        Today's Diagnoses     Cough    -  1    Community acquired pneumonia          Care Instructions      Pneumonia (Adult)  Pneumonia is an infection deep within the lungs. It is in the small air sacs (alveoli). Pneumonia may be caused by a virus or bacteria. Pneumonia caused by bacteria is usually treated with an antibiotic. Severe cases may need to be treated in the hospital. Milder cases can be treated at home. Symptoms usually start to get better during the first 2 days of treatment.    Home care  Follow these guidelines when caring for yourself at home:    Rest at home for the first 2 to 3 days, or until you feel stronger. Don t let yourself get overly tired when you go back to your activities.    Stay away from cigarette smoke - yours or other people s.    You may use acetaminophen or ibuprofen to control fever or pain, unless another medicine was prescribed. If you have chronic liver or kidney disease, talk with your health care provider before using these medicines. Also talk with your provider if you ve had a stomach ulcer or GI bleeding. Don t give aspirin to anyone younger than 18 years of age who is ill with a fever. It may cause severe liver damage.    Your appetite may be poor, so a light diet is fine.    Drink 6 to 8 glasses of fluids every day to make sure you are getting enough fluids. Beverages can include water, sport drinks, sodas without caffeine, juices, tea, or soup. Fluids will help loosen secretions in the lung. This will make it easier for you to cough up the phlegm (sputum). If you also have heart or kidney disease, check with your health care provider before you drink extra fluids.    Take antibiotic  medicine prescribed until it is all gone, even if you are feeling better after a few days.  Follow-up care  Follow up with your health care provider in the next 2 to 3 days, or as advised. This is to be sure the medicine is helping you get better.  If you are 65 or older, you should get a pneumococcal vaccine and a yearly flu (influenza) shot. You should also get these vaccines if you have chronic lung disease like asthma, emphysema, or COPD. Ask your provider about this.  When to seek medical advice  Call your health care provider right away if any of these occur:    You don t get better within the first 48 hours of treatment    Shortness of breath gets worse    Rapid breathing (more than 25 breaths per minute)    Coughing up blood    Chest pain gets worse with breathing    Fever of 102 F (38 C) or higher that doesn t get better with fever medicine    Weakness, dizziness, or fainting that gets worse    Thirst or dry mouth that gets worse    Sinus pain, headache, or a stiff neck    Chest pain not caused by coughing    0381-2163 [employer]. [address] [place] [address]. All rights reserved. This information is not intended as a substitute for professional medical care. Always follow your healthcare professional's instructions.        Patient Education    Prednisone Gastro-resistant tablet    Prednisone Oral solution    Prednisone Oral tablet  Prednisone Oral tablet  What is this medicine?  PREDNISONE (PRED ni sone) is a corticosteroid. It is commonly used to treat inflammation of the skin, joints, lungs, and other organs. Common conditions treated include asthma, allergies, and arthritis. It is also used for other conditions, such as blood disorders and diseases of the adrenal glands.  This medicine may be used for other purposes; ask your health care provider or pharmacist if you have questions.  What should I tell my health care provider before I take this medicine?  They need to know if  you have any of these conditions:    Cushing's syndrome    diabetes    glaucoma    heart disease    high blood pressure    infection (especially a virus infection such as chickenpox, cold sores, or herpes)    kidney disease    liver disease    mental illness    myasthenia gravis    osteoporosis    seizures    stomach or intestine problems    thyroid disease    an unusual or allergic reaction to lactose, prednisone, other medicines, foods, dyes, or preservatives    pregnant or trying to get pregnant    breast-feeding  How should I use this medicine?  Take this medicine by mouth with a glass of water. Follow the directions on the prescription label. Take this medicine with food. If you are taking this medicine once a day, take it in the morning. Do not take more medicine than you are told to take. Do not suddenly stop taking your medicine because you may develop a severe reaction. Your doctor will tell you how much medicine to take. If your doctor wants you to stop the medicine, the dose may be slowly lowered over time to avoid any side effects.  Talk to your pediatrician regarding the use of this medicine in children. Special care may be needed.  Overdosage: If you think you have taken too much of this medicine contact a poison control center or emergency room at once.  NOTE: This medicine is only for you. Do not share this medicine with others.  What if I miss a dose?  If you miss a dose, take it as soon as you can. If it is almost time for your next dose, talk to your doctor or health care professional. You may need to miss a dose or take an extra dose. Do not take double or extra doses without advice.  What may interact with this medicine?  Do not take this medicine with any of the following medications:    metyrapone    mifepristone  This medicine may also interact with the following medications:    aminoglutethimide    amphotericin B    aspirin and aspirin-like medicines    barbiturates    certain medicines for  diabetes, like glipizide or glyburide    cholestyramine    cholinesterase inhibitors    cyclosporine    digoxin    diuretics    ephedrine    female hormones, like estrogens and birth control pills    isoniazid    ketoconazole    NSAIDS, medicines for pain and inflammation, like ibuprofen or naproxen    phenytoin    rifampin    toxoids    vaccines    warfarin  This list may not describe all possible interactions. Give your health care provider a list of all the medicines, herbs, non-prescription drugs, or dietary supplements you use. Also tell them if you smoke, drink alcohol, or use illegal drugs. Some items may interact with your medicine.  What should I watch for while using this medicine?  Visit your doctor or health care professional for regular checks on your progress. If you are taking this medicine over a prolonged period, carry an identification card with your name and address, the type and dose of your medicine, and your doctor's name and address.  This medicine may increase your risk of getting an infection. Tell your doctor or health care professional if you are around anyone with measles or chickenpox, or if you develop sores or blisters that do not heal properly.  If you are going to have surgery, tell your doctor or health care professional that you have taken this medicine within the last twelve months.  Ask your doctor or health care professional about your diet. You may need to lower the amount of salt you eat.  This medicine may affect blood sugar levels. If you have diabetes, check with your doctor or health care professional before you change your diet or the dose of your diabetic medicine.  What side effects may I notice from receiving this medicine?  Side effects that you should report to your doctor or health care professional as soon as possible:    allergic reactions like skin rash, itching or hives, swelling of the face, lips, or tongue    changes in emotions or moods    changes in  vision    depressed mood    eye pain    fever or chills, cough, sore throat, pain or difficulty passing urine    increased thirst    swelling of ankles, feet  Side effects that usually do not require medical attention (report to your doctor or health care professional if they continue or are bothersome):    confusion, excitement, restlessness    headache    nausea, vomiting    skin problems, acne, thin and shiny skin    trouble sleeping    weight gain  This list may not describe all possible side effects. Call your doctor for medical advice about side effects. You may report side effects to FDA at 2-549-NDQ-7357.  Where should I keep my medicine?  Keep out of the reach of children.  Store at room temperature between 15 and 30 degrees C (59 and 86 degrees F). Protect from light. Keep container tightly closed. Throw away any unused medicine after the expiration date.  NOTE:This sheet is a summary. It may not cover all possible information. If you have questions about this medicine, talk to your doctor, pharmacist, or health care provider. Copyright  2016 [employer]              Follow-ups after your visit        Who to contact     If you have questions or need follow up information about today's clinic visit or your schedule please contact [facility] directly at [phone].  Normal or non-critical lab and imaging results will be communicated to you by MyChart, letter or phone within 4 business days after the clinic has received the results. If you do not hear from us within 7 days, please contact the clinic through [employer] or phone. If you have a critical or abnormal lab result, we will notify you by phone as soon as possible.  Submit refill requests through [employer] or call your pharmacy and they will forward the refill request to us. Please allow 3 business days for your refill to be completed.          Additional Information About Your Visit        MyChart Information      "[employer] lets you send messages to your doctor, view your test results, renew your prescriptions, schedule appointments and more. To sign up, go to [email or web address] . Click on \"Log in\" on the left side of the screen, which will take you to the Welcome page. Then click on \"Sign up Now\" on the right side of the page.     You will be asked to enter the access code listed below, as well as some personal information. Please follow the directions to create your username and password.     Your access code is: [email or web address]  Expires: 2017  3:53 PM     Your access code will  in 90 days. If you need help or a new code, please call your [place] clinic or [phone].        Care EveryWhere ID     This is your Care EveryWhere ID. This could be used by other organizations to access your [place] medical records  [id number]        Your Vitals Were     Pulse Temperature Respirations Pulse Oximetry          113 98.9  F (37.2  C) (Tympanic) 22 98%         Blood Pressure from Last 3 Encounters:   17 129/72   17 114/73    Weight from Last 3 Encounters:   17 128 lb 9.6 oz (58.3 kg)              We Performed the Following     Influenza A/B antigen          Today's Medication Changes          These changes are accurate as of: 3/30/17  7:06 PM.  If you have any questions, ask your nurse or doctor.               Start taking these medicines.        Dose/Directions    levofloxacin 500 MG tablet   Commonly known as:  LEVAQUIN   Used for:  Community acquired pneumonia   Started by:  Ankti Bright MD        Dose:  500 mg   Take 1 tablet (500 mg) by mouth daily   Quantity:  10 tablet   Refills:  0         These medicines have changed or have updated prescriptions.        Dose/Directions    albuterol 108 (90 BASE) MCG/ACT Inhaler   Commonly known as:  PROAIR HFA/PROVENTIL HFA/VENTOLIN HFA   This may have changed:  Another medication with the same name was removed. Continue taking this medication, and " follow the directions you see here.   Used for:  Acute bronchitis with symptoms > 10 days   Changed by:  Liliana Knox APRN CNP        Dose:  2 puff   Inhale 2 puffs into the lungs every 6 hours as needed for shortness of breath / dyspnea or wheezing   Quantity:  1 Inhaler   Refills:  0       predniSONE 20 MG tablet   Commonly known as:  DELTASONE   This may have changed:    - how much to take  - how to take this  - when to take this  - additional instructions   Used for:  Community acquired pneumonia   Changed by:  Ankit Bright MD        Dose:  40 mg   Take 2 tablets (40 mg) by mouth daily for 5 days   Quantity:  10 tablet   Refills:  0         Stop taking these medicines if you haven't already. Please contact your care team if you have questions.     amoxicillin-clavulanate 875-125 MG per tablet   Commonly known as:  AUGMENTIN   Stopped by:  Ankit Bright MD           azithromycin 250 MG tablet   Commonly known as:  ZITHROMAX Z-MATTHIEU   Stopped by:  Ankit Bright MD                Where to get your medicines      These medications were sent to [id number] ([place], [place]) [address]  [address] [facility] [address] [facility] [address]    Hours:  Closed 10-16-08 business to [facility] Phone:  [phone]     levofloxacin 500 MG tablet    predniSONE 20 MG tablet                Primary Care Provider    None Specified       No primary provider on file.        Thank you!     Thank you for choosing [facility]  for your care. Our goal is always to provide you with excellent care. Hearing back from our patients is one way we can continue to improve our services. Please take a few minutes to complete the written survey that you may receive in the mail after your visit with us. Thank you!             Your Updated Medication List - Protect others around you: Learn how to safely use, store and throw away your medicines at www.disposemymeds.org.          This list is  accurate as of: 3/30/17  7:06 PM.  Always use your most recent med list.                   Brand Name Dispense Instructions for use    albuterol 108 (90 BASE) MCG/ACT Inhaler    PROAIR HFA/PROVENTIL HFA/VENTOLIN HFA    1 Inhaler    Inhale 2 puffs into the lungs every 6 hours as needed for shortness of breath / dyspnea or wheezing       levofloxacin 500 MG tablet    LEVAQUIN    10 tablet    Take 1 tablet (500 mg) by mouth daily       predniSONE 20 MG tablet    DELTASONE    10 tablet    Take 2 tablets (40 mg) by mouth daily for 5 days

## 2017-03-30 NOTE — PROGRESS NOTES
SUBJECTIVE:                                                    Emani Luna is a 36 year old female who presents to clinic today for the following health issues:      Acute Illness   Acute illness concerns: cough  Onset: week    Fever:     Chills/Sweats: YES    Headache (location?): YES    Sinus Pressure:no    Conjunctivitis:  no    Ear Pain: YES-     Rhinorrhea: YES    Congestion: YES    Sore Throat: no     Cough: YES (whitish phlegm)    Wheeze: YES    Decreased Appetite: no    Nausea: no    Vomiting: no    Diarrhea:  no    Dysuria/Freq.: no    Fatigue/Achiness: YES    Sick/Strep Exposure: was just treated for pnuemonia on 2/11/17 had prednisone, amox/zap and albuterol. Getting worse again     Therapies Tried and outcome: prednisone, abx, albuterol  She smokes cigarette every other weekend or so.         Problem list and histories reviewed & adjusted, as indicated.  Additional history: as documented    There is no problem list on file for this patient.    History reviewed. No pertinent surgical history.    Social History   Substance Use Topics     Smoking status: Unknown If Ever Smoked     Smokeless tobacco: Not on file     Alcohol use Not on file     History reviewed. No pertinent family history.      Current Outpatient Prescriptions   Medication Sig Dispense Refill     albuterol (PROAIR HFA/PROVENTIL HFA/VENTOLIN HFA) 108 (90 BASE) MCG/ACT Inhaler Inhale 2 puffs into the lungs every 6 hours as needed for shortness of breath / dyspnea or wheezing 1 Inhaler 0     albuterol (2.5 MG/3ML) 0.083% neb solution Take 1 vial (2.5 mg) by nebulization once 3 mL 0     predniSONE (DELTASONE) 20 MG tablet Take one tablet twice a day for 5 days. 10 tablet 0     azithromycin (ZITHROMAX Z-MATTHIEU) 250 MG tablet Take 2 tablets on day 1 and then take 1 tablet days 2-5 6 tablet 0     amoxicillin-clavulanate (AUGMENTIN) 875-125 MG per tablet Take 1 tablet by mouth 2 times daily 20 tablet 0     No Known Allergies  No lab results  found.   BP Readings from Last 3 Encounters:   03/30/17 129/72   02/11/17 114/73    Wt Readings from Last 3 Encounters:   02/11/17 128 lb 9.6 oz (58.3 kg)                  Labs reviewed in EPIC    Reviewed and updated as needed this visit by clinical staff  Tobacco  Allergies  Med Hx  Surg Hx  Fam Hx  Soc Hx      Reviewed and updated as needed this visit by Provider         ROS:  Constitutional, HEENT, cardiovascular, pulmonary, gi and gu systems are negative, except as otherwise noted.    OBJECTIVE:                                                    /72  Pulse 113  Temp 98.9  F (37.2  C) (Tympanic)  Resp 22  SpO2 98%  There is no height or weight on file to calculate BMI.  GENERAL: alert and no distress  EYES: Eyes grossly normal to inspection, PERRL and conjunctivae and sclerae normal  HENT: ear canals and TM's normal, nose and mouth without ulcers or lesions  NECK: no adenopathy, no asymmetry, masses, or scars and thyroid normal to palpation  RESP: crackles involving right lung base with occasional wheeze bilaterally   CV: tachycardia, normal S1 S2, no S3 or S4 and no murmur, click or rub  MS: no gross musculoskeletal defects noted, no edema  SKIN: no suspicious lesions or rashes    XR CHEST 2 VW 3/30/2017 6:55 PM     HISTORY: Cough.     COMPARISON: February 11, 2017.     IMPRESSION: Mild patchy opacification of the right lung base,  concerning for infection. The left lung is clear. No pleural effusions  or pneumothorax. Stable heart and mediastinum.     ASSESSMENT/PLAN:                                                          ICD-10-CM    1. Cough R05 XR Chest 2 Views     CANCELED: Influenza A/B antigen   2. Community acquired pneumonia J18.9 levofloxacin (LEVAQUIN) 500 MG tablet     predniSONE (DELTASONE) 20 MG tablet     Discussed in detail differentials and further management. Symptoms are likely secondary to community acquired pneumonia. She was treated with augmentin and azithromycin about 1  month ago. Levaquin prescribed, common side effects including tendinopathy discussed. Prednisone ordered and written information provided. Recommended well hydration, over-the-counter analgesia and rest. Return criteria discussed and suggested to follow up with PCP next week or go to ER if symptoms worsen. Patient understood and in agreement with the above plan. All questions are answered.       MEDICATIONS:   Orders Placed This Encounter   Medications     levofloxacin (LEVAQUIN) 500 MG tablet     Sig: Take 1 tablet (500 mg) by mouth daily     Dispense:  10 tablet     Refill:  0     predniSONE (DELTASONE) 20 MG tablet     Sig: Take 2 tablets (40 mg) by mouth daily for 5 days     Dispense:  10 tablet     Refill:  0     Patient Instructions     Pneumonia (Adult)  Pneumonia is an infection deep within the lungs. It is in the small air sacs (alveoli). Pneumonia may be caused by a virus or bacteria. Pneumonia caused by bacteria is usually treated with an antibiotic. Severe cases may need to be treated in the hospital. Milder cases can be treated at home. Symptoms usually start to get better during the first 2 days of treatment.    Home care  Follow these guidelines when caring for yourself at home:    Rest at home for the first 2 to 3 days, or until you feel stronger. Don t let yourself get overly tired when you go back to your activities.    Stay away from cigarette smoke - yours or other people s.    You may use acetaminophen or ibuprofen to control fever or pain, unless another medicine was prescribed. If you have chronic liver or kidney disease, talk with your health care provider before using these medicines. Also talk with your provider if you ve had a stomach ulcer or GI bleeding. Don t give aspirin to anyone younger than 18 years of age who is ill with a fever. It may cause severe liver damage.    Your appetite may be poor, so a light diet is fine.    Drink 6 to 8 glasses of fluids every day to make sure you are  getting enough fluids. Beverages can include water, sport drinks, sodas without caffeine, juices, tea, or soup. Fluids will help loosen secretions in the lung. This will make it easier for you to cough up the phlegm (sputum). If you also have heart or kidney disease, check with your health care provider before you drink extra fluids.    Take antibiotic medicine prescribed until it is all gone, even if you are feeling better after a few days.  Follow-up care  Follow up with your health care provider in the next 2 to 3 days, or as advised. This is to be sure the medicine is helping you get better.  If you are 65 or older, you should get a pneumococcal vaccine and a yearly flu (influenza) shot. You should also get these vaccines if you have chronic lung disease like asthma, emphysema, or COPD. Ask your provider about this.  When to seek medical advice  Call your health care provider right away if any of these occur:    You don t get better within the first 48 hours of treatment    Shortness of breath gets worse    Rapid breathing (more than 25 breaths per minute)    Coughing up blood    Chest pain gets worse with breathing    Fever of 102 F (38 C) or higher that doesn t get better with fever medicine    Weakness, dizziness, or fainting that gets worse    Thirst or dry mouth that gets worse    Sinus pain, headache, or a stiff neck    Chest pain not caused by coughing    6064-5513 [employer]. [address] [place] [address], [place] [id number]. All rights reserved. This information is not intended as a substitute for professional medical care. Always follow your healthcare professional's instructions.        Patient Education    Prednisone Gastro-resistant tablet    Prednisone Oral solution    Prednisone Oral tablet  Prednisone Oral tablet  What is this medicine?  PREDNISONE (PRED ni sone) is a corticosteroid. It is commonly used to treat inflammation of the skin, joints, lungs, and other organs. Common conditions  treated include asthma, allergies, and arthritis. It is also used for other conditions, such as blood disorders and diseases of the adrenal glands.  This medicine may be used for other purposes; ask your health care provider or pharmacist if you have questions.  What should I tell my health care provider before I take this medicine?  They need to know if you have any of these conditions:    Cushing's syndrome    diabetes    glaucoma    heart disease    high blood pressure    infection (especially a virus infection such as chickenpox, cold sores, or herpes)    kidney disease    liver disease    mental illness    myasthenia gravis    osteoporosis    seizures    stomach or intestine problems    thyroid disease    an unusual or allergic reaction to lactose, prednisone, other medicines, foods, dyes, or preservatives    pregnant or trying to get pregnant    breast-feeding  How should I use this medicine?  Take this medicine by mouth with a glass of water. Follow the directions on the prescription label. Take this medicine with food. If you are taking this medicine once a day, take it in the morning. Do not take more medicine than you are told to take. Do not suddenly stop taking your medicine because you may develop a severe reaction. Your doctor will tell you how much medicine to take. If your doctor wants you to stop the medicine, the dose may be slowly lowered over time to avoid any side effects.  Talk to your pediatrician regarding the use of this medicine in children. Special care may be needed.  Overdosage: If you think you have taken too much of this medicine contact a poison control center or emergency room at once.  NOTE: This medicine is only for you. Do not share this medicine with others.  What if I miss a dose?  If you miss a dose, take it as soon as you can. If it is almost time for your next dose, talk to your doctor or health care professional. You may need to miss a dose or take an extra dose. Do not take  double or extra doses without advice.  What may interact with this medicine?  Do not take this medicine with any of the following medications:    metyrapone    mifepristone  This medicine may also interact with the following medications:    aminoglutethimide    amphotericin B    aspirin and aspirin-like medicines    barbiturates    certain medicines for diabetes, like glipizide or glyburide    cholestyramine    cholinesterase inhibitors    cyclosporine    digoxin    diuretics    ephedrine    female hormones, like estrogens and birth control pills    isoniazid    ketoconazole    NSAIDS, medicines for pain and inflammation, like ibuprofen or naproxen    phenytoin    rifampin    toxoids    vaccines    warfarin  This list may not describe all possible interactions. Give your health care provider a list of all the medicines, herbs, non-prescription drugs, or dietary supplements you use. Also tell them if you smoke, drink alcohol, or use illegal drugs. Some items may interact with your medicine.  What should I watch for while using this medicine?  Visit your doctor or health care professional for regular checks on your progress. If you are taking this medicine over a prolonged period, carry an identification card with your name and address, the type and dose of your medicine, and your doctor's name and address.  This medicine may increase your risk of getting an infection. Tell your doctor or health care professional if you are around anyone with measles or chickenpox, or if you develop sores or blisters that do not heal properly.  If you are going to have surgery, tell your doctor or health care professional that you have taken this medicine within the last twelve months.  Ask your doctor or health care professional about your diet. You may need to lower the amount of salt you eat.  This medicine may affect blood sugar levels. If you have diabetes, check with your doctor or health care professional before you change your  diet or the dose of your diabetic medicine.  What side effects may I notice from receiving this medicine?  Side effects that you should report to your doctor or health care professional as soon as possible:    allergic reactions like skin rash, itching or hives, swelling of the face, lips, or tongue    changes in emotions or moods    changes in vision    depressed mood    eye pain    fever or chills, cough, sore throat, pain or difficulty passing urine    increased thirst    swelling of ankles, feet  Side effects that usually do not require medical attention (report to your doctor or health care professional if they continue or are bothersome):    confusion, excitement, restlessness    headache    nausea, vomiting    skin problems, acne, thin and shiny skin    trouble sleeping    weight gain  This list may not describe all possible side effects. Call your doctor for medical advice about side effects. You may report side effects to FDA at 2-427-FDA-0381.  Where should I keep my medicine?  Keep out of the reach of children.  Store at room temperature between 15 and 30 degrees C (59 and 86 degrees F). Protect from light. Keep container tightly closed. Throw away any unused medicine after the expiration date.  NOTE:This sheet is a summary. It may not cover all possible information. If you have questions about this medicine, talk to your doctor, pharmacist, or health care provider. Copyright  2016 [employer]            Ankit Bright MD  [facility]

## 2017-03-31 NOTE — PATIENT INSTRUCTIONS
Pneumonia (Adult)  Pneumonia is an infection deep within the lungs. It is in the small air sacs (alveoli). Pneumonia may be caused by a virus or bacteria. Pneumonia caused by bacteria is usually treated with an antibiotic. Severe cases may need to be treated in the hospital. Milder cases can be treated at home. Symptoms usually start to get better during the first 2 days of treatment.    Home care  Follow these guidelines when caring for yourself at home:    Rest at home for the first 2 to 3 days, or until you feel stronger. Don t let yourself get overly tired when you go back to your activities.    Stay away from cigarette smoke - yours or other people s.    You may use acetaminophen or ibuprofen to control fever or pain, unless another medicine was prescribed. If you have chronic liver or kidney disease, talk with your health care provider before using these medicines. Also talk with your provider if you ve had a stomach ulcer or GI bleeding. Don t give aspirin to anyone younger than 18 years of age who is ill with a fever. It may cause severe liver damage.    Your appetite may be poor, so a light diet is fine.    Drink 6 to 8 glasses of fluids every day to make sure you are getting enough fluids. Beverages can include water, sport drinks, sodas without caffeine, juices, tea, or soup. Fluids will help loosen secretions in the lung. This will make it easier for you to cough up the phlegm (sputum). If you also have heart or kidney disease, check with your health care provider before you drink extra fluids.    Take antibiotic medicine prescribed until it is all gone, even if you are feeling better after a few days.  Follow-up care  Follow up with your health care provider in the next 2 to 3 days, or as advised. This is to be sure the medicine is helping you get better.  If you are 65 or older, you should get a pneumococcal vaccine and a yearly flu (influenza) shot. You should also get these vaccines if you have  chronic lung disease like asthma, emphysema, or COPD. Ask your provider about this.  When to seek medical advice  Call your health care provider right away if any of these occur:    You don t get better within the first 48 hours of treatment    Shortness of breath gets worse    Rapid breathing (more than 25 breaths per minute)    Coughing up blood    Chest pain gets worse with breathing    Fever of 102 F (38 C) or higher that doesn t get better with fever medicine    Weakness, dizziness, or fainting that gets worse    Thirst or dry mouth that gets worse    Sinus pain, headache, or a stiff neck    Chest pain not caused by coughing    3938-1962 [employer]. [address] [place] [address]. All rights reserved. This information is not intended as a substitute for professional medical care. Always follow your healthcare professional's instructions.        Patient Education    Prednisone Gastro-resistant tablet    Prednisone Oral solution    Prednisone Oral tablet  Prednisone Oral tablet  What is this medicine?  PREDNISONE (PRED ni sone) is a corticosteroid. It is commonly used to treat inflammation of the skin, joints, lungs, and other organs. Common conditions treated include asthma, allergies, and arthritis. It is also used for other conditions, such as blood disorders and diseases of the adrenal glands.  This medicine may be used for other purposes; ask your health care provider or pharmacist if you have questions.  What should I tell my health care provider before I take this medicine?  They need to know if you have any of these conditions:    Cushing's syndrome    diabetes    glaucoma    heart disease    high blood pressure    infection (especially a virus infection such as chickenpox, cold sores, or herpes)    kidney disease    liver disease    mental illness    myasthenia gravis    osteoporosis    seizures    stomach or intestine problems    thyroid disease    an unusual or allergic  reaction to lactose, prednisone, other medicines, foods, dyes, or preservatives    pregnant or trying to get pregnant    breast-feeding  How should I use this medicine?  Take this medicine by mouth with a glass of water. Follow the directions on the prescription label. Take this medicine with food. If you are taking this medicine once a day, take it in the morning. Do not take more medicine than you are told to take. Do not suddenly stop taking your medicine because you may develop a severe reaction. Your doctor will tell you how much medicine to take. If your doctor wants you to stop the medicine, the dose may be slowly lowered over time to avoid any side effects.  Talk to your pediatrician regarding the use of this medicine in children. Special care may be needed.  Overdosage: If you think you have taken too much of this medicine contact a poison control center or emergency room at once.  NOTE: This medicine is only for you. Do not share this medicine with others.  What if I miss a dose?  If you miss a dose, take it as soon as you can. If it is almost time for your next dose, talk to your doctor or health care professional. You may need to miss a dose or take an extra dose. Do not take double or extra doses without advice.  What may interact with this medicine?  Do not take this medicine with any of the following medications:    metyrapone    mifepristone  This medicine may also interact with the following medications:    aminoglutethimide    amphotericin B    aspirin and aspirin-like medicines    barbiturates    certain medicines for diabetes, like glipizide or glyburide    cholestyramine    cholinesterase inhibitors    cyclosporine    digoxin    diuretics    ephedrine    female hormones, like estrogens and birth control pills    isoniazid    ketoconazole    NSAIDS, medicines for pain and inflammation, like ibuprofen or naproxen    phenytoin    rifampin    toxoids    vaccines    warfarin  This list may not  describe all possible interactions. Give your health care provider a list of all the medicines, herbs, non-prescription drugs, or dietary supplements you use. Also tell them if you smoke, drink alcohol, or use illegal drugs. Some items may interact with your medicine.  What should I watch for while using this medicine?  Visit your doctor or health care professional for regular checks on your progress. If you are taking this medicine over a prolonged period, carry an identification card with your name and address, the type and dose of your medicine, and your doctor's name and address.  This medicine may increase your risk of getting an infection. Tell your doctor or health care professional if you are around anyone with measles or chickenpox, or if you develop sores or blisters that do not heal properly.  If you are going to have surgery, tell your doctor or health care professional that you have taken this medicine within the last twelve months.  Ask your doctor or health care professional about your diet. You may need to lower the amount of salt you eat.  This medicine may affect blood sugar levels. If you have diabetes, check with your doctor or health care professional before you change your diet or the dose of your diabetic medicine.  What side effects may I notice from receiving this medicine?  Side effects that you should report to your doctor or health care professional as soon as possible:    allergic reactions like skin rash, itching or hives, swelling of the face, lips, or tongue    changes in emotions or moods    changes in vision    depressed mood    eye pain    fever or chills, cough, sore throat, pain or difficulty passing urine    increased thirst    swelling of ankles, feet  Side effects that usually do not require medical attention (report to your doctor or health care professional if they continue or are bothersome):    confusion, excitement, restlessness    headache    nausea, vomiting    skin  problems, acne, thin and shiny skin    trouble sleeping    weight gain  This list may not describe all possible side effects. Call your doctor for medical advice about side effects. You may report side effects to FDA at 9-687-YND-4698.  Where should I keep my medicine?  Keep out of the reach of children.  Store at room temperature between 15 and 30 degrees C (59 and 86 degrees F). Protect from light. Keep container tightly closed. Throw away any unused medicine after the expiration date.  NOTE:This sheet is a summary. It may not cover all possible information. If you have questions about this medicine, talk to your doctor, pharmacist, or health care provider. Copyright  2016 [employer]

## 2020-03-05 ENCOUNTER — OFFICE VISIT (OUTPATIENT)
Dept: URGENT CARE | Facility: URGENT CARE | Age: 40
End: 2020-03-05

## 2020-03-05 VITALS
WEIGHT: 163.2 LBS | OXYGEN SATURATION: 97 % | DIASTOLIC BLOOD PRESSURE: 56 MMHG | HEIGHT: 65 IN | BODY MASS INDEX: 27.19 KG/M2 | HEART RATE: 99 BPM | RESPIRATION RATE: 16 BRPM | SYSTOLIC BLOOD PRESSURE: 110 MMHG | TEMPERATURE: 97.7 F

## 2020-03-05 DIAGNOSIS — R21 RASH: Primary | ICD-10-CM

## 2020-03-05 PROCEDURE — 99213 OFFICE O/P EST LOW 20 MIN: CPT | Performed by: EMERGENCY MEDICINE

## 2020-03-05 RX ORDER — TRIAMCINOLONE ACETONIDE 1 MG/G
CREAM TOPICAL
COMMUNITY
Start: 2020-02-23 | End: 2021-03-27

## 2020-03-05 ASSESSMENT — MIFFLIN-ST. JEOR: SCORE: 1421.15

## 2020-03-06 NOTE — PATIENT INSTRUCTIONS
Review information on the Internet regarding scabies          Benzyl benzoate (10 or 25%) is listed as a possible alternative.          If symptoms persist a possible dermatology referral may be helpful.    Patient Education     Scabies  Scabies is a skin infection. It is caused by a tiny parasitic insect, or mite, that is too small to see directly. It can be seen under a microscope, but it is usually recognized only by the rash and symptoms it causes. This can make it hard to diagnose since the signs and symptoms can be similar to other diseases.  The scabies mite tunnels under the skin. It creates a small burrow, where it leaves its eggs. These eggs bauer and grow into adults. They then create new burrows over the next 1 to 2 weeks. The mites die in about 4 to 6 weeks. The rash and itching are caused by an allergic reaction to the scabies saliva or feces.  Scabies is highly contagious. It is spread by direct skin contact. It is easily spread by close personal contact, sexual contact, or by sharing bed linens or clothing used by an infected person.  It may take 4 to 6 weeks for symptoms to appear after being exposed. Everyone living in the house with you, as well as your sexual partners, should be treated at the same time. After the first treatment, you will no longer be contagious. You may return to work, school or .  Home care    Machine wash in hot water all sheets, towels, pillowcases, underwear, pajamas, and any other clothing you have worn lately. Use the hot cycle of a dryer or use a hot iron to sterilize.    Seal anything that is hard to wash in a plastic trash bag for 4 days. This includes coats, jackets, blankets, and bedspreads. (The insects die after 3 days off the human body.)  Medicines  Scabicides  Medicines used to treat scabies are called scabicides. These are creams that kill the scabies mites. A prescription is needed. When using these medicines:    Always follow instructions  provided by your healthcare provider and pharmacist. Also follow the printed instructions that come with the medicine.    Talk with your provider about precautions to take when using these medicines.    Use the cream on your body when your skin is cool and dry. Don t use it after a hot shower or bath.    Usually the cream is put on your whole body. This means from your chin all the way down to your toes. Scabies does not usually affect an adult s head. So cream is not needed there. For children, discuss this with your child s provider.    Leave the cream or lotion on for the recommended amount of time. This is usually 8 to 12 hours.    Don t leave cream or lotion on your skin longer than directed. Don t use more than recommended.    Clean clothes should be worn after the treatment.    If you wash your hands after using the cream, you will need to reapply the cream to your hands.    If you are breastfeeding, wash off your nipples before feeding. Then reapply the cream after breastfeeding.    For babies or infants, put mittens on their hands. This will stop them from licking the cream or lotion. It will also stop them from scratching themselves because of the itching.  Other medicines    An oral medicine called ivermectin may be prescribed for severe cases. It may also be used if you can t apply creams.    Itching may cause the most discomfort. If large areas of your skin are affected, over-the-counter antihistamines may be used to reduce itching. Or you may be given a prescription antihistamine. Some of these medicines may make you sleepy. They are best used at bedtime. Antihistamines that don t make you sleepy can be used during the day. Note: Don t use medicine that has diphenhydramine if you have glaucoma, or if you are a man who has trouble urinating due to an enlarged prostate.    If you were given antibiotics due to a bacterial infection, take them until they are finished. It is important to finish the  antibiotics even if the wound looks better. This is to make sure the infection has cleared.  Follow-up care  Follow up with your healthcare provider, or as advised. Call your provider if your symptoms don t improve after 1 week, or if new burrows or rashes appear.  When to seek medical advice  Call your healthcare provider right away if any of these occur:    Yellow-brown crusts or drainage from the sores    Other signs of infection, including increasing redness, swelling, pain, or pus    Fever of 100.4 F (38 C) or higher, or as directed by your provider  Date Last Reviewed: 8/1/2016 2000-2019 [employer]. [address] [place] [address], [place] [address]. All rights reserved. This information is not intended as a substitute for professional medical care. Always follow your healthcare professional's instructions.           Patient Education     What You Should Know About Scabies  [facility]  What is scabies?  Scabies is an itchy skin condition caused by mites (bugs) that burrow under the skin. It spreads easily and infects people no matter their age, sex or personal hygiene.   Scabies is found in many countries, including the [place]. It is common in care centers and orphanages where children are in close contact with each other.  If your child brings scabies into your home, it is possible that other household members will get it.   Picking up your child  When you  your child, bring several tubes of scabies cream, enough for everyone in your household. Use the correct cream for their age:    For children at least 2 months old and adults: Use permethrin cream 5%. The brand name is [place].    For infants under 2 months old: Use sulfur ointment 5 to 10%. Several brands are available.  You will need a prescription from your health care provider or a travel clinic.  Signs of scabies    Widespread, bumpy rash on the hands or feet or between the finger or toes    The rash is clustered  together, or there is a line indicating a burrowing mite    Intense itching  If you think your child has scabies  These mites move slowly. If your child has scabies, you probably won't get it if you shower once a day and don't share a bed with your child. But we advise treating all household members to be sure.   Apply scabies cream or ointment  Permethrin cream and sulfur ointment are the safest treatments for scabies. (Permethrin cream should not be used in infants under 2 months old.)     Apply the scabies treatment to all household members just before bedtime.    Spread the cream or ointment from the neck down to the toes and in between them. Mites like to lay their eggs in the nooks and crannies of the body.    For infants and young children:  ? Also apply the scabies treatment to the face and entire head.  ? Put mittens on the child to prevent them from licking or swallowing the medicine.    Leave the cream on 8 to 14 hours, then shower or bathe to remove it.  ? If you are pregnant or breastfeeding, wash it off after 8 hours. While this is the first medicine of choice, use it with caution.  Treatment will kill the mite. Once treatment is done, your child can no longer spread mites to others. The itchy rash may remain for another 7 to 10 days.   Wash items    Wash all clothing, bedding and towels that have been used 3 days prior to treatment. Use hot water, then dry in a hot dryer.    Wash all of your child's toys with hot water and soap or in the washing machine.    If items cannot be washed (or hot water is not available), place items in closed plastic bags, then:  ? Store the bags away from the family for 7 days. It is believed the mite cannot survive without skin contact.  ? Or, store the bag in a cold place for 2 weeks or freeze it for 24 hours. These methods will kill the mites and their eggs.  After you are home  The cream should prevent your family from bringing scabies home. But, sometimes, it is  "difficult to fully get rid of scabies. Signs of scabies also may not show up for 4 to 6 weeeks after coming into contact with it. You may need to treat your family again once you are back home. If a household member has scabies, repeat the instructions in this handout.   There is no need to treat the family pet. The type of mite that infests humans is different from those that infest pets.  When to call the doctor  If you or a family member have a reaction to the cream, or you still have symptoms after the full course of treatment, see your doctor. You will need a different treatment.   For informational purposes only. Not to replace the advice of your health care provider.   Copyright   2007 [employer]. All rights reserved. OROS 337487 - 08/17.  For informational purposes only. Not to replace the advice of your health care provider.  Copyright   2018 [employer]. All rights reserved.    <HTML><META HTTP-EQUIV=\"content-type\" CONTENT=\"text/html;charset=utf-8\"><P class=\"bulletIndent1\"><SPAN class=\"glyph\">â--</SPAN><STRONG>Efficacy</STRONG> â   High-quality trials comparing scabies treatments are limited [<A class=\"abstract_t\" href=\"/contents/scabies-management/abstract/4,5\">4,5</A>]. In a systematic review and meta-analysis of randomized trials, topical <A class=\"drug drug_general\" href=\"/contents/permethrin-drug-information?search=scabies&amp;yrnsjTyy=155241&amp;source=see_link\" data-topicid=\"9759\">permethrin</A> and oral <A class=\"drug drug_general\" href=\"/contents/ivermectin-drug-information?search=scabies&amp;ynucbNpt=966739&amp;source=see_link\" data-topicid=\"8641\">ivermectin</A> appeared similarly effective [<A class=\"abstract_t\" href=\"/contents/scabies-management/abstract/9\">9</A>]. </P><P><P class=\"bulletIndent1\"><SPAN class=\"glyph\">â--</SPAN><STRONG>Adverse effects</STRONG> â   <A class=\"drug drug_general\" " "href=\"/contents/permethrin-drug-information?search=scabies&amp;mmbeuGdl=841787&amp;source=see_link\" data-topicid=\"6959\">Permethrin</A> is generally well tolerated. Skin irritation is a potential side effect.</P><P><P class=\"headingAnchor\" id=\"[id number]\"><SPAN class=\"h4\">Oral ivermectin</SPAN><SPAN class=\"headingEndMark\"> â   </SPAN>Oral <A class=\"drug drug_general\" href=\"/contents/ivermectin-drug-information?search=scabies&amp;xwoepUhw=005266&amp;source=see_link\" data-topicid=\"8641\">ivermectin</A> is an antiparasitic alternative to <A class=\"drug drug_general\" href=\"/contents/permethrin-drug-information?search=scabies&amp;cyoosMpk=228778&amp;source=see_link\" data-topicid=\"0159\">permethrin</A> that has the advantage of ease of administration. This mode of treatment may be particularly useful for large scabies outbreaks in nursing homes and other facilities where topical therapy can be impractical. Oral ivermectin is not a recommended first-line treatment for pregnant or lactating women and children who weigh less than 15 kg. (See <A class=\"local\" href=\"#[id number]\" data-see-link-view-event=\"\">'Special populations'</A> below.)</P><P class=\"bulletIndent1\"><SPAN class=\"glyph\">â--</SPAN><STRONG>Administration</STRONG> â   <A class=\"drug drug_general\" href=\"/contents/ivermectin-drug-information?search=scabies&amp;ckggvUdu=355103&amp;source=see_link\" data-topicid=\"8641\">Ivermectin</A> therapy for classic scabies consists of a 200 mcg/kg single dose followed by a repeat dose after one to two weeks [<A class=\"abstract_t\" href=\"/contents/scabies-management/abstract/10-12\">10-12</A>].</P><P><P class=\"bulletIndent1\"><SPAN class=\"glyph\">â--</SPAN><STRONG>Efficacy</STRONG> â   Various studies support the efficacy of oral <A class=\"drug drug_general\" href=\"/contents/ivermectin-drug-information?search=scabies&amp;strcqLfc=067198&amp;source=see_link\" data-topicid=\"3895\">ivermectin</A>. One randomized trial (n = 55) found a higher " "cure rate at seven days with single-dose ivermectin (200 mcg/kg) than placebo (79 versus 16 percent) [<A class=\"abstract_t\" href=\"/contents/scabies-management/abstract/13\">13</A>]. In addition, based upon a systematic review and meta-analysis of randomized trials, oral ivermectin appears to be as effective as topical <A class=\"drug drug_general\" href=\"/contents/permethrin-drug-information?search=scabies&amp;eondkLyq=149252&amp;source=see_link\" data-topicid=\"9759\">permethrin</A> [<A class=\"abstract_t\" href=\"/contents/scabies-management/abstract/9\">9</A>].</P><P><P class=\"bulletIndent1\"><SPAN class=\"glyph\">â--</SPAN><STRONG>Adverse effects</STRONG> â   Oral <A class=\"drug drug_general\" href=\"/contents/ivermectin-drug-information?search=scabies&amp;ucjzqWhs=407756&amp;source=see_link\" data-topicid=\"8641\">ivermectin</A> is generally well tolerated in patients treated for scabies; most reports of severe adverse effects have occurred in patients with helminthic infections [<A class=\"abstract_t\" href=\"/contents/scabies-management/abstract/14,15\">14,15</A>]. The validity of an isolated report of increased deaths among residents of a nursing home treated with oral ivermectin for scabies has been questioned [<A class=\"abstract_t\" href=\"/contents/scabies-management/abstract/16-18\">16-18</A>].</P><P><P class=\"headingAnchor\" id=\"[id number]\"><SPAN class=\"h3\">Other agents</SPAN><SPAN class=\"headingEndMark\"> â   </SPAN>Additional topical treatment options for scabies include benzyl benzoate, topical sulfur, <A class=\"drug drug_general\" href=\"/contents/lindane-drug-information?search=scabies&amp;gqgifZwt=347453&amp;source=see_link\" data-topicid=\"9562\">lindane</A>, <A class=\"drug drug_general\" href=\"/contents/crotamiton-drug-information?search=scabies&amp;ljxysSxk=927169&amp;source=see_link\" data-topicid=\"9301\">crotamiton</A>, and <A class=\"drug drug_general\" " "href=\"/contents/malathion-drug-information?search=scabies&amp;pamyvPdf=586757&amp;source=see_link\" data-topicid=\"9584\">malathion</A> [<A class=\"abstract_t\" href=\"/contents/scabies-management/abstract/1,3,10,19\">1,3,10,19</A>]. These agents have not been shown to be more effective than topical <A class=\"drug drug_general\" href=\"/contents/permethrin-drug-information?search=scabies&amp;sqkyvWyo=029966&amp;source=see_link\" data-topicid=\"9759\">permethrin</A> [<A class=\"abstract_t\" href=\"/contents/scabies-management/abstract/4,5\">4,5</A>]. Topical <A class=\"drug drug_general\" href=\"/contents/ivermectin-drug-information?search=scabies&amp;qqajuDlf=923951&amp;source=see_link\" data-topicid=\"8641\">ivermectin</A> is a newer, albeit high-cost, agent that appears to have efficacy for scabies [<A class=\"abstract_t\" href=\"/contents/scabies-management/abstract/20-22\">20-22</A>]. In an open-label, randomized trial that compared permethrin, topical ivermectin, and oral ivermectin, cure rates for permethrin and topical ivermectin were similar [<A class=\"abstract_t\" href=\"/contents/scabies-management/abstract/20\">20</A>].</P><P>Benzyl benzoate (10 or 25%) is commonly used in resource-limited countries because of the drug's low cost. Treatment regimens vary; the drug may be applied once daily at night on two consecutive days, with a repeat treatment cycle after seven days [<A class=\"abstract_t\" href=\"/contents/scabies-management/abstract/1\">1</A>]. Benzyl benzoate is not available in the [place].</P><P>Topical sulfur (6 to 33%) is relatively inexpensive and primarily used for the treatment of neonates and pregnant women. Sulfur ointment is applied overnight for three consecutive days. In the [place], the drug must be compounded. We typically prescribe 240 g of 8 or 10% precipitated sulfur in petrolatum for adults. (See <A class=\"local\" href=\"#[id number]\" data-see-link-view-event=\"\">'Special populations'</A> below.)</P><P>Use " "of <A class=\"drug drug_general\" href=\"/contents/lindane-drug-information?search=scabies&amp;qncceUau=330196&amp;source=see_link\" data-topicid=\"9562\">lindane</A> has fallen out of favor due to risk for systemic toxicity (eg, seizures, death) [<A class=\"abstract_t\" href=\"/contents/scabies-management/abstract/1,10\">1,10</A>]. Lindane should be used only as an alternative therapy in patients who cannot tolerate other therapies or when other therapies have failed [<A class=\"abstract_t\" href=\"/contents/scabies-management/abstract/10\">10</A>]. A thin layer of lindane 1% (1 oz of lotion or 30 g of cream) is applied to all areas of the body from the neck down and thoroughly washed off after eight hours [<A class=\"abstract_t\" href=\"/contents/scabies-management/abstract/10\">10</A>].  and [place] guidelines recommend against use of this therapy [<A class=\"abstract_t\" href=\"/contents/scabies-management/abstract/1,2\">1,2</A>]. (See <A class=\"medical medical_review\" href=\"/contents/pediculosis-capitis?sectionName=Lindane+toxicity&amp;search=scabies&amp;yjweyThk=987063&amp;rvedlh=[id number]&amp;source=see_link#[id number]\">\"Pediculosis capitis\", section on 'Lindane toxicity'</A>.)</P>       "

## 2020-03-06 NOTE — PROGRESS NOTES
Chief complaint: Rash     HPI: Patient is a 39-year-old female who was treated for scabies about 2 weeks ago.  She did use the lindane cream.  1 week later she developed significant local skin irritation mainly on the right side of her neck.  She did however do the full treatment with the lindane.  She is been using some cortisone on the redness with marked improvement.  However she is feeling somewhat itchy and is noticed some bumps at the base of her scalp as well as on her arms and legs.  She realizes she might be paranoid but wants to be checked for persistent scabies.    Past medical history: Chart reviewed the record  Review of systems: See HPI otherwise negative  Physical exam: Patient's vital signs are reviewed and are normal see chart.  Examination of her skin reveals occasional nonspecific punctate red marks seen at the base of her scalp and on her arms and legs.  There is very sparse.  Intertriginous regions show no obvious lesions.  Scalp reveals no scabies eggs.    Impression: Rash.    Nonspecific itching and skin findings.  This may represent failed lindane treatment or persistent skin irritation post treatment.  Plan: Patient will continue to use triamcinolone cream on the areas.  If symptoms persist she should be reevaluated in 1 week.  Possible dermatology referral may be necessary due to the fact that the lindane really cannot be used on her at a secondary treatment modality must be instituted.

## 2020-03-06 NOTE — NURSING NOTE
Chief Complaint   Patient presents with     Rash     Daughter had supposedly scabies over a week ago.  she had developed a rash on neck and used daughters prometherin cream then got worse. Went to ER and got Kenalog cream improved.  will get zings and noticed a bump on her neck.         Initial /56 (BP Location: Right arm, Patient Position: Chair, Cuff Size: Adult Regular)   Pulse 99   Temp 97.7  F (36.5  C) (Tympanic)   Resp 16   Wt 74 kg (163 lb 3.2 oz)   SpO2 97%  There is no height or weight on file to calculate BMI.    Patient presents to the clinic using No DME    Health Maintenance that is potentially due pending provider review:  NONE    n/a    Is there anyone who you would like to be able to receive your results? No  If yes have patient fill out FREDERIC Lei M.A.

## 2021-03-27 ENCOUNTER — OFFICE VISIT (OUTPATIENT)
Dept: URGENT CARE | Facility: URGENT CARE | Age: 41
End: 2021-03-27

## 2021-03-27 VITALS
RESPIRATION RATE: 14 BRPM | OXYGEN SATURATION: 99 % | TEMPERATURE: 98 F | HEART RATE: 84 BPM | SYSTOLIC BLOOD PRESSURE: 122 MMHG | BODY MASS INDEX: 28.46 KG/M2 | DIASTOLIC BLOOD PRESSURE: 76 MMHG | WEIGHT: 171 LBS

## 2021-03-27 DIAGNOSIS — Z87.448 HX OF PYELONEPHRITIS: ICD-10-CM

## 2021-03-27 DIAGNOSIS — M54.50 ACUTE LEFT-SIDED LOW BACK PAIN WITHOUT SCIATICA: Primary | ICD-10-CM

## 2021-03-27 LAB
ALBUMIN UR-MCNC: NEGATIVE MG/DL
APPEARANCE UR: CLEAR
BILIRUB UR QL STRIP: NEGATIVE
COLOR UR AUTO: YELLOW
GLUCOSE UR STRIP-MCNC: NEGATIVE MG/DL
HGB UR QL STRIP: ABNORMAL
KETONES UR STRIP-MCNC: NEGATIVE MG/DL
LEUKOCYTE ESTERASE UR QL STRIP: NEGATIVE
NITRATE UR QL: NEGATIVE
NON-SQ EPI CELLS #/AREA URNS LPF: NORMAL /LPF
PH UR STRIP: 5.5 PH (ref 5–7)
RBC #/AREA URNS AUTO: NORMAL /HPF
SOURCE: ABNORMAL
SP GR UR STRIP: <=1.005 (ref 1–1.03)
UROBILINOGEN UR STRIP-ACNC: 0.2 EU/DL (ref 0.2–1)
WBC #/AREA URNS AUTO: NORMAL /HPF

## 2021-03-27 PROCEDURE — 81001 URINALYSIS AUTO W/SCOPE: CPT | Performed by: PHYSICIAN ASSISTANT

## 2021-03-27 PROCEDURE — 99213 OFFICE O/P EST LOW 20 MIN: CPT | Performed by: PHYSICIAN ASSISTANT

## 2021-03-27 PROCEDURE — 87086 URINE CULTURE/COLONY COUNT: CPT | Performed by: PHYSICIAN ASSISTANT

## 2021-03-27 RX ORDER — KETOROLAC TROMETHAMINE 10 MG/1
10 TABLET, FILM COATED ORAL EVERY 6 HOURS PRN
Qty: 20 TABLET | Refills: 0 | Status: SHIPPED | OUTPATIENT
Start: 2021-03-27

## 2021-03-27 RX ORDER — CYCLOBENZAPRINE HCL 10 MG
10 TABLET ORAL 3 TIMES DAILY PRN
Qty: 30 TABLET | Refills: 0 | Status: SHIPPED | OUTPATIENT
Start: 2021-03-27

## 2021-03-27 ASSESSMENT — PAIN SCALES - GENERAL: PAINLEVEL: EXTREME PAIN (8)

## 2021-03-27 NOTE — PROGRESS NOTES
Assessment & Plan     Acute left-sided low back pain without sciatica  Reassurance, normal UA, culture is pending. This is likely musculoskeletal strain. Will treat with ketorolac (TORADOL) 10 MG tablet; Take 1 tablet (10 mg) by mouth every 6 hours as needed for moderate pain and cyclobenzaprine (FLEXERIL) 10 MG tablet; Take 1 tablet (10 mg) by mouth 3 times daily as needed for muscle spasms. Discussed how to take these medications and what to expect. Can try alternating heat/ice in 20 minute intervals. Avoid aggravating factors, but encouraged gentle ROM and stretching. Would recommend PT or f/u with PCP if symptoms worsen or do not improve.      - *UA reflex to Microscopic and Culture ([place] [facility] (except [place] and [place])  - Urine Microscopic    Hx of pyelonephritis    - Urine Culture Aerobic Bacterial                 Return in about 1 week (around 4/3/2021), or if symptoms worsen or fail to improve.    Sheryl Glover PA-C  [facility] [place]    Subjective     Chief Complaint   Patient presents with     Back Pain     Lower left side that started this AM. Back was very stiff this AM when she woke up. Did go to the chiropractor this AM.      Urinary Problem     Would like to be checked for a UTI          HPI     Back Pain    Onset of symptoms was this morning   Location: left low back  Radiation: does not radiate  Context:       The injury happened while no known injury       Mechanism: none recalled by the patient      Patient experienced delayed pain  Course of symptoms is worsening.    Severity moderate  Current and Associated symptoms: left low back pain  Denies: fecal incontinence, urinary incontinence, lower extremity numbness, lower extremity weakness and paresthesia    Aggravating Factors: nothing   Therapies to improve symptoms include: chiropractor  Past history: recurrent self limited episodes of low back pain in the past    Patient reports history of  pyelonephritis when she was 9 months pregnant about 15 years ago. She had no symptoms other that back pain and was admitted to the hospital for treatment. She never had urinary frequency, urgency, or burning. She would like to check urine today.                 Review of Systems   Constitutional: Negative for chills, fatigue and fever.   HENT: Negative for congestion, ear pain, rhinorrhea, sinus pressure, sinus pain and sore throat.    Eyes: Negative for pain, discharge and redness.   Respiratory: Negative for cough, shortness of breath and wheezing.    Cardiovascular: Negative for palpitations.   Gastrointestinal: Negative for abdominal pain, constipation, diarrhea, heartburn, hematochezia, nausea and vomiting.   Musculoskeletal: Positive for back pain. Negative for arthralgias and myalgias.   Skin: Negative for rash.            Objective    /76 (BP Location: Right arm, Patient Position: Sitting, Cuff Size: Adult Regular)   Pulse 84   Temp 98  F (36.7  C) (Tympanic)   Resp 14   Wt 77.6 kg (171 lb)   SpO2 99%   Breastfeeding No   BMI 28.46 kg/m    Body mass index is 28.46 kg/m .  Physical Exam  Constitutional:       General: She is not in acute distress.  Abdominal:      Tenderness: There is no right CVA tenderness or left CVA tenderness.   Musculoskeletal:      Comments: No obvious deformity, erythema, edema, or ecchymosis of the thoracic or lumbar spine. Mild tenderness with palpation along the left lumbar spine and surrounding musculature. Negative straight leg raises. Non-tender internal and external rotation of the hips. 2+ DTR s, lower extremity CMS intact.    Skin:     General: Skin is warm and dry.   Neurological:      Mental Status: She is alert.   Psychiatric:         Mood and Affect: Mood normal.         Speech: Speech normal.         Behavior: Behavior normal.            Results for orders placed or performed in visit on 03/27/21 (from the past 24 hour(s))   *UA reflex to Microscopic and  Culture ([place] and [facility] (except [place] and [place])    Specimen: Midstream Urine   Result Value Ref Range    Color Urine Yellow     Appearance Urine Clear     Glucose Urine Negative NEG^Negative mg/dL    Bilirubin Urine Negative NEG^Negative    Ketones Urine Negative NEG^Negative mg/dL    Specific Gravity Urine <=1.005 1.003 - 1.035    Blood Urine Trace (A) NEG^Negative    pH Urine 5.5 5.0 - 7.0 pH    Protein Albumin Urine Negative NEG^Negative mg/dL    Urobilinogen Urine 0.2 0.2 - 1.0 EU/dL    Nitrite Urine Negative NEG^Negative    Leukocyte Esterase Urine Negative NEG^Negative    Source Midstream Urine    Urine Microscopic   Result Value Ref Range    WBC Urine 0 - 5 OTO5^0 - 5 /HPF    RBC Urine O - 2 OTO2^O - 2 /HPF    Squamous Epithelial /LPF Urine Few FEW^Few /LPF

## 2021-03-28 ASSESSMENT — ENCOUNTER SYMPTOMS
FATIGUE: 0
COUGH: 0
ARTHRALGIAS: 0
SINUS PRESSURE: 0
SINUS PAIN: 0
SHORTNESS OF BREATH: 0
DIARRHEA: 0
WHEEZING: 0
CONSTIPATION: 0
VOMITING: 0
HEARTBURN: 0
MYALGIAS: 0
SORE THROAT: 0
NAUSEA: 0
CHILLS: 0
FEVER: 0
EYE REDNESS: 0
PALPITATIONS: 0
BACK PAIN: 1
ABDOMINAL PAIN: 0
EYE PAIN: 0
HEMATOCHEZIA: 0
RHINORRHEA: 0
EYE DISCHARGE: 0

## 2021-03-29 LAB
BACTERIA SPEC CULT: NORMAL
Lab: NORMAL
SPECIMEN SOURCE: NORMAL

## 2022-11-09 ENCOUNTER — HOSPITAL ENCOUNTER (EMERGENCY)
Facility: CLINIC | Age: 42
Discharge: LEFT BEFORE DISCHARGE | End: 2022-11-09
Payer: COMMERCIAL

## 2022-11-09 VITALS
SYSTOLIC BLOOD PRESSURE: 130 MMHG | OXYGEN SATURATION: 98 % | BODY MASS INDEX: 25.66 KG/M2 | DIASTOLIC BLOOD PRESSURE: 86 MMHG | HEART RATE: 88 BPM | HEIGHT: 65 IN | WEIGHT: 154 LBS | TEMPERATURE: 98.3 F

## 2022-11-10 NOTE — ED TRIAGE NOTES
"Playing volleyball and \"felt a snap in back of left calf.     Triage Assessment     Row Name 11/09/22 2007       Triage Assessment (Adult)    Airway WDL WDL       Respiratory WDL    Respiratory WDL WDL       Skin Circulation/Temperature WDL    Skin Circulation/Temperature WDL WDL       Cardiac WDL    Cardiac WDL WDL       Peripheral/Neurovascular WDL    Peripheral Neurovascular WDL WDL       Cognitive/Neuro/Behavioral WDL    Cognitive/Neuro/Behavioral WDL WDL              "

## 2023-12-29 ENCOUNTER — OFFICE VISIT (OUTPATIENT)
Dept: URGENT CARE | Facility: URGENT CARE | Age: 43
End: 2023-12-29
Payer: COMMERCIAL

## 2023-12-29 VITALS
OXYGEN SATURATION: 99 % | SYSTOLIC BLOOD PRESSURE: 131 MMHG | BODY MASS INDEX: 26.13 KG/M2 | DIASTOLIC BLOOD PRESSURE: 79 MMHG | HEART RATE: 86 BPM | TEMPERATURE: 98.6 F | RESPIRATION RATE: 16 BRPM | WEIGHT: 157 LBS

## 2023-12-29 DIAGNOSIS — B96.89 BV (BACTERIAL VAGINOSIS): ICD-10-CM

## 2023-12-29 DIAGNOSIS — N76.0 BV (BACTERIAL VAGINOSIS): ICD-10-CM

## 2023-12-29 DIAGNOSIS — N94.9 VAGINAL DISCOMFORT: Primary | ICD-10-CM

## 2023-12-29 DIAGNOSIS — N39.41 URGENCY INCONTINENCE: ICD-10-CM

## 2023-12-29 LAB
ALBUMIN UR-MCNC: NEGATIVE MG/DL
APPEARANCE UR: CLEAR
BILIRUB UR QL STRIP: NEGATIVE
CLUE CELLS: PRESENT
COLOR UR AUTO: YELLOW
GLUCOSE UR STRIP-MCNC: NEGATIVE MG/DL
HGB UR QL STRIP: ABNORMAL
KETONES UR STRIP-MCNC: NEGATIVE MG/DL
LEUKOCYTE ESTERASE UR QL STRIP: NEGATIVE
NITRATE UR QL: NEGATIVE
PH UR STRIP: 7 [PH] (ref 5–7)
RBC #/AREA URNS AUTO: NORMAL /HPF
SP GR UR STRIP: 1.01 (ref 1–1.03)
TRICHOMONAS, WET PREP: ABNORMAL
UROBILINOGEN UR STRIP-ACNC: 0.2 E.U./DL
WBC #/AREA URNS AUTO: NORMAL /HPF
WBC'S/HIGH POWER FIELD, WET PREP: ABNORMAL
YEAST, WET PREP: ABNORMAL

## 2023-12-29 PROCEDURE — 81001 URINALYSIS AUTO W/SCOPE: CPT | Performed by: PHYSICIAN ASSISTANT

## 2023-12-29 PROCEDURE — 87491 CHLMYD TRACH DNA AMP PROBE: CPT | Performed by: PHYSICIAN ASSISTANT

## 2023-12-29 PROCEDURE — 87591 N.GONORRHOEAE DNA AMP PROB: CPT | Performed by: PHYSICIAN ASSISTANT

## 2023-12-29 PROCEDURE — 99214 OFFICE O/P EST MOD 30 MIN: CPT | Performed by: PHYSICIAN ASSISTANT

## 2023-12-29 PROCEDURE — 87210 SMEAR WET MOUNT SALINE/INK: CPT | Performed by: PHYSICIAN ASSISTANT

## 2023-12-29 RX ORDER — METRONIDAZOLE 500 MG/1
500 TABLET ORAL 2 TIMES DAILY
Qty: 14 TABLET | Refills: 0 | Status: SHIPPED | OUTPATIENT
Start: 2023-12-29 | End: 2024-01-05

## 2023-12-29 ASSESSMENT — ENCOUNTER SYMPTOMS
EYE REDNESS: 0
EYE ITCHING: 1
EYE DISCHARGE: 0
EYE PAIN: 1
DYSURIA: 0

## 2023-12-30 LAB
C TRACH DNA SPEC QL NAA+PROBE: NEGATIVE
N GONORRHOEA DNA SPEC QL NAA+PROBE: NEGATIVE

## 2023-12-30 NOTE — PROGRESS NOTES
"SUBJECTIVE:   Emani Luna is a 43 year old female presenting with a chief complaint of   Chief Complaint   Patient presents with    Dysuria     Urinary discomfort, urgency and pressure in vaginal area      Eye Problem     Left eye has a little tenderness and swelling, pt wondering if she has a \"stye\"       She is a new patient of [facility].  Patient presents with 3 weeks of vaginal pain.  Patient is now on her menses.  (2) left eye irritation.  No contacts.    Treatment:  none    Review of Systems   Eyes:  Positive for pain and itching. Negative for discharge and redness.   Genitourinary:  Negative for dysuria and vaginal discharge.   All other systems reviewed and are negative.      History reviewed. No pertinent past medical history.  History reviewed. No pertinent family history.  Current Outpatient Medications   Medication Sig Dispense Refill    metroNIDAZOLE (FLAGYL) 500 MG tablet Take 1 tablet (500 mg) by mouth 2 times daily for 7 days 14 tablet 0    cyclobenzaprine (FLEXERIL) 10 MG tablet Take 1 tablet (10 mg) by mouth 3 times daily as needed for muscle spasms (Patient not taking: Reported on 12/29/2023) 30 tablet 0    ketorolac (TORADOL) 10 MG tablet Take 1 tablet (10 mg) by mouth every 6 hours as needed for moderate pain (Patient not taking: Reported on 12/29/2023) 20 tablet 0     Social History     Tobacco Use    Smoking status: Never    Smokeless tobacco: Never   Substance Use Topics    Alcohol use: Not on file       OBJECTIVE  /79   Pulse 86   Temp 98.6  F (37  C) (Tympanic)   Resp 16   Wt 71.2 kg (157 lb)   SpO2 99%   BMI 26.13 kg/m      Physical Exam  Vitals reviewed.   Constitutional:       Appearance: Normal appearance. She is normal weight.   Eyes:      Extraocular Movements: Extraocular movements intact.      Conjunctiva/sclera: Conjunctivae normal.      Pupils: Pupils are equal, round, and reactive to light.      Comments: Left eye lower lid with a small 1 mm yellow cyst "   Cardiovascular:      Rate and Rhythm: Normal rate.   Neurological:      Mental Status: She is alert.         Labs:  Results for orders placed or performed in visit on 12/29/23 (from the past 24 hour(s))   Wet prep - Clinic Collect    Specimen: Vagina; Swab   Result Value Ref Range    Trichomonas Absent Absent    Yeast Absent Absent    Clue Cells Present (A) Absent    WBCs/high power field 1+ (A) None   UA Macroscopic with reflex to Microscopic and Culture - Clinic Collect    Specimen: Urine, Clean Catch   Result Value Ref Range    Color Urine Yellow Colorless, Straw, Light Yellow, Yellow    Appearance Urine Clear Clear    Glucose Urine Negative Negative mg/dL    Bilirubin Urine Negative Negative    Ketones Urine Negative Negative mg/dL    Specific Gravity Urine 1.015 1.003 - 1.035    Blood Urine Trace (A) Negative    pH Urine 7.0 5.0 - 7.0    Protein Albumin Urine Negative Negative mg/dL    Urobilinogen Urine 0.2 0.2, 1.0 E.U./dL    Nitrite Urine Negative Negative    Leukocyte Esterase Urine Negative Negative   UA Microscopic with Reflex to Culture   Result Value Ref Range    RBC Urine 0-2 0-2 /HPF /HPF    WBC Urine 0-5 0-5 /HPF /HPF    Narrative    Urine Culture not indicated       ASSESSMENT:      ICD-10-CM    1. Vaginal discomfort  N94.9 Wet prep - Clinic Collect     UA Macroscopic with reflex to Microscopic and Culture - Clinic Collect     Chlamydia trachomatis PCR - Clinic Collect     Neisseria gonorrhoeae PCR - Clinic Collect     UA Microscopic with Reflex to Culture      2. Urgency incontinence  N39.41 UA Macroscopic with reflex to Microscopic and Culture - Clinic Collect     UA Microscopic with Reflex to Culture      3. BV (bacterial vaginosis)  N76.0 metroNIDAZOLE (FLAGYL) 500 MG tablet    B96.89            Medical Decision Making:    Differential Diagnosis:  UTI: UTI, Dysuria, Pyelonephritis, Kidney Stone, Urethritis, and Vaginitis  Eye Problem: Stye (external)  Stye (internal)    Serious Comorbid  Conditions:  Adult:   reviewed    PLAN:    GC pending.  Will call with any abnormalities.    Rx for flagyl.  No etoh while taking flagyl.    Don't touch eye.  Monitor.      Followup:    If not improving or if condition worsens, follow up with your Primary Care Provider, If not improving or if conditions worsens over the next 12-24 hours, go to the Emergency Department    There are no Patient Instructions on file for this visit.